# Patient Record
Sex: FEMALE | Race: WHITE | NOT HISPANIC OR LATINO | Employment: OTHER | URBAN - METROPOLITAN AREA
[De-identification: names, ages, dates, MRNs, and addresses within clinical notes are randomized per-mention and may not be internally consistent; named-entity substitution may affect disease eponyms.]

---

## 2018-09-21 ENCOUNTER — CONVERSION ENCOUNTER (OUTPATIENT)
Dept: FAMILY MEDICINE CLINIC | Facility: CLINIC | Age: 68
End: 2018-09-21

## 2018-09-26 ENCOUNTER — CONVERSION ENCOUNTER (OUTPATIENT)
Dept: FAMILY MEDICINE CLINIC | Facility: CLINIC | Age: 68
End: 2018-09-26

## 2018-10-26 ENCOUNTER — CONVERSION ENCOUNTER (OUTPATIENT)
Dept: FAMILY MEDICINE CLINIC | Facility: CLINIC | Age: 68
End: 2018-10-26

## 2018-11-02 ENCOUNTER — CONVERSION ENCOUNTER (OUTPATIENT)
Dept: FAMILY MEDICINE CLINIC | Facility: CLINIC | Age: 68
End: 2018-11-02

## 2018-12-07 ENCOUNTER — CONVERSION ENCOUNTER (OUTPATIENT)
Dept: FAMILY MEDICINE CLINIC | Facility: CLINIC | Age: 68
End: 2018-12-07

## 2019-05-15 ENCOUNTER — CONVERSION ENCOUNTER (OUTPATIENT)
Dept: FAMILY MEDICINE CLINIC | Facility: CLINIC | Age: 69
End: 2019-05-15

## 2019-05-15 LAB
ALBUMIN SERPL-MCNC: 3.6 G/DL (ref 3.6–5.1)
ALBUMIN/GLOB SERPL: ABNORMAL {RATIO} (ref 1–2.1)
ALP SERPL-CCNC: 28 UNITS/L (ref 33–130)
ALT SERPL-CCNC: 19 UNITS/L (ref 6–40)
AST SERPL-CCNC: 19 UNITS/L (ref 10–35)
BASOPHILS # BLD AUTO: ABNORMAL 10*3/MM3 (ref 0–200)
BASOPHILS NFR BLD AUTO: 1 %
BILIRUB SERPL-MCNC: 0.5 MG/DL (ref 0.2–1.2)
BUN SERPL-MCNC: 15 MG/DL (ref 7–25)
BUN/CREAT SERPL: ABNORMAL (ref 6–22)
CALCIUM SERPL-MCNC: 9.4 MG/DL (ref 8.6–10.2)
CHLORIDE SERPL-SCNC: 96 MMOL/L (ref 98–110)
CONV CO2: 23 MMOL/L (ref 21–33)
CONV NEUTROPHILS/100 LEUKOCYTES IN BODY FLUID BY MANUAL COUNT: 71 %
CONV TOTAL PROTEIN: 6.9 G/DL (ref 6.2–8.3)
CREAT UR-MCNC: 0.9 MG/DL (ref 0.6–1.18)
EOSINOPHIL # BLD AUTO: 2 %
EOSINOPHIL # BLD AUTO: ABNORMAL 10*3/MM3 (ref 15–500)
ERYTHROCYTE [DISTWIDTH] IN BLOOD BY AUTOMATED COUNT: 13.9 % (ref 11–15)
GLOBULIN UR ELPH-MCNC: ABNORMAL G/DL (ref 2.2–3.9)
GLUCOSE SERPL-MCNC: 136 MG/DL (ref 65–99)
HCT VFR BLD AUTO: 38.1 % (ref 35–45)
HGB BLD-MCNC: 12.6 G/DL (ref 11.7–15.5)
LYMPHOCYTES # BLD AUTO: ABNORMAL 10*3/MM3 (ref 850–3900)
LYMPHOCYTES NFR BLD AUTO: 18 %
MCH RBC QN AUTO: 31.8 PG (ref 27–33)
MCHC RBC AUTO-ENTMCNC: ABNORMAL % (ref 32–36)
MCV RBC AUTO: 96.2 FL (ref 80–100)
MONOCYTES # BLD AUTO: ABNORMAL 10*3/MICROLITER (ref 200–950)
MONOCYTES NFR BLD AUTO: 8 %
NEUTROPHILS # BLD AUTO: ABNORMAL 10*3/MM3 (ref 1500–7800)
PLATELET # BLD AUTO: ABNORMAL 10*3/MM3 (ref 140–400)
PMV BLD AUTO: 8 FL (ref 7.5–11.5)
POTASSIUM SERPL-SCNC: 4.3 MMOL/L (ref 3.5–5.3)
RBC # BLD AUTO: ABNORMAL 10*6/MM3 (ref 3.8–5.1)
SODIUM SERPL-SCNC: 133 MMOL/L (ref 135–146)
WBC # BLD AUTO: ABNORMAL 10*3/MM3 (ref 3.8–10.8)

## 2019-06-03 VITALS
SYSTOLIC BLOOD PRESSURE: 114 MMHG | DIASTOLIC BLOOD PRESSURE: 90 MMHG | SYSTOLIC BLOOD PRESSURE: 123 MMHG | DIASTOLIC BLOOD PRESSURE: 70 MMHG | SYSTOLIC BLOOD PRESSURE: 140 MMHG | DIASTOLIC BLOOD PRESSURE: 79 MMHG | SYSTOLIC BLOOD PRESSURE: 132 MMHG | DIASTOLIC BLOOD PRESSURE: 75 MMHG | HEART RATE: 91 BPM | HEART RATE: 85 BPM | DIASTOLIC BLOOD PRESSURE: 87 MMHG | HEART RATE: 91 BPM | HEART RATE: 86 BPM | SYSTOLIC BLOOD PRESSURE: 103 MMHG

## 2019-07-02 DIAGNOSIS — E11.9 TYPE 2 DIABETES MELLITUS WITHOUT COMPLICATION, UNSPECIFIED WHETHER LONG TERM INSULIN USE (HCC): ICD-10-CM

## 2019-07-02 DIAGNOSIS — E03.9 HYPOTHYROIDISM, UNSPECIFIED TYPE: ICD-10-CM

## 2019-07-02 DIAGNOSIS — I10 HYPERTENSION, UNSPECIFIED TYPE: ICD-10-CM

## 2019-07-02 DIAGNOSIS — IMO0002 ELEVATION OF LEVEL OF TRANSAMINASE OR LACTIC ACID DEHYDROGENASE (LDH): ICD-10-CM

## 2019-07-02 DIAGNOSIS — R74.8 ABNORMAL LIVER ENZYMES: ICD-10-CM

## 2019-07-02 DIAGNOSIS — N18.30 CHRONIC KIDNEY DISEASE, STAGE III (MODERATE) (HCC): ICD-10-CM

## 2019-07-02 DIAGNOSIS — E78.5 HYPERLIPIDEMIA, UNSPECIFIED HYPERLIPIDEMIA TYPE: Primary | ICD-10-CM

## 2019-07-02 PROBLEM — R15.9 BOWEL INCONTINENCE: Status: ACTIVE | Noted: 2018-04-23

## 2019-07-02 PROBLEM — Z91.14 POOR COMPLIANCE WITH MEDICATION: Status: ACTIVE | Noted: 2018-09-18

## 2019-07-02 PROBLEM — M19.90 OSTEOARTHRITIS: Status: ACTIVE | Noted: 2019-07-02

## 2019-07-02 PROBLEM — R00.0 TACHYCARDIA: Status: ACTIVE | Noted: 2018-09-18

## 2019-07-02 PROBLEM — R21 SKIN RASH: Status: ACTIVE | Noted: 2019-01-14

## 2019-07-02 PROBLEM — Z23 ENCOUNTER FOR IMMUNIZATION: Status: ACTIVE | Noted: 2017-09-20

## 2019-07-02 PROBLEM — F32.A DEPRESSION: Status: ACTIVE | Noted: 2019-07-02

## 2019-07-02 PROBLEM — Z91.148 POOR COMPLIANCE WITH MEDICATION: Status: ACTIVE | Noted: 2018-09-18

## 2019-07-02 PROBLEM — R41.0 CONFUSION: Status: ACTIVE | Noted: 2017-09-20

## 2019-07-03 ENCOUNTER — TELEPHONE (OUTPATIENT)
Dept: FAMILY MEDICINE CLINIC | Facility: CLINIC | Age: 69
End: 2019-07-03

## 2019-07-03 LAB
ALBUMIN SERPL-MCNC: 4.2 G/DL (ref 3.5–4.8)
ALBUMIN/GLOB SERPL: 1.3 {RATIO} (ref 1.2–2.2)
ALP SERPL-CCNC: 34 IU/L (ref 39–117)
ALT SERPL-CCNC: 15 IU/L (ref 0–32)
AST SERPL-CCNC: 14 IU/L (ref 0–40)
BASOPHILS # BLD AUTO: 0.1 X10E3/UL (ref 0–0.2)
BASOPHILS NFR BLD AUTO: 1 %
BILIRUB SERPL-MCNC: <0.2 MG/DL (ref 0–1.2)
BUN SERPL-MCNC: 19 MG/DL (ref 8–27)
BUN/CREAT SERPL: 25 (ref 12–28)
CALCIUM SERPL-MCNC: 9.9 MG/DL (ref 8.7–10.3)
CHLORIDE SERPL-SCNC: 94 MMOL/L (ref 96–106)
CHOLEST SERPL-MCNC: 157 MG/DL (ref 100–199)
CO2 SERPL-SCNC: 25 MMOL/L (ref 20–29)
CREAT SERPL-MCNC: 0.75 MG/DL (ref 0.57–1)
EOSINOPHIL # BLD AUTO: 0.2 X10E3/UL (ref 0–0.4)
EOSINOPHIL NFR BLD AUTO: 2 %
ERYTHROCYTE [DISTWIDTH] IN BLOOD BY AUTOMATED COUNT: 13.2 % (ref 12.3–15.4)
GLOBULIN SER CALC-MCNC: 3.3 G/DL (ref 1.5–4.5)
GLUCOSE SERPL-MCNC: 112 MG/DL (ref 65–99)
HBA1C MFR BLD: 7 % (ref 4.8–5.6)
HCT VFR BLD AUTO: 40.3 % (ref 34–46.6)
HDLC SERPL-MCNC: 51 MG/DL
HGB BLD-MCNC: 13.4 G/DL (ref 11.1–15.9)
IMM GRANULOCYTES # BLD AUTO: 0 X10E3/UL (ref 0–0.1)
IMM GRANULOCYTES NFR BLD AUTO: 0 %
LACTATE SERPL-MCNC: 17.2 MG/DL (ref 4.8–25.7)
LDLC SERPL CALC-MCNC: 82 MG/DL (ref 0–99)
LYMPHOCYTES # BLD AUTO: 1.6 X10E3/UL (ref 0.7–3.1)
LYMPHOCYTES NFR BLD AUTO: 18 %
MCH RBC QN AUTO: 31.2 PG (ref 26.6–33)
MCHC RBC AUTO-ENTMCNC: 33.3 G/DL (ref 31.5–35.7)
MCV RBC AUTO: 94 FL (ref 79–97)
MONOCYTES # BLD AUTO: 0.8 X10E3/UL (ref 0.1–0.9)
MONOCYTES NFR BLD AUTO: 10 %
NEUTROPHILS # BLD AUTO: 6.1 X10E3/UL (ref 1.4–7)
NEUTROPHILS NFR BLD AUTO: 69 %
PLATELET # BLD AUTO: 391 X10E3/UL (ref 150–450)
POTASSIUM SERPL-SCNC: 5.2 MMOL/L (ref 3.5–5.2)
PROT SERPL-MCNC: 7.5 G/DL (ref 6–8.5)
RBC # BLD AUTO: 4.29 X10E6/UL (ref 3.77–5.28)
SODIUM SERPL-SCNC: 132 MMOL/L (ref 134–144)
T3FREE SERPL-MCNC: 2.4 PG/ML (ref 2–4.4)
T4 SERPL-MCNC: 6.4 UG/DL (ref 4.5–12)
TRIGL SERPL-MCNC: 120 MG/DL (ref 0–149)
TSH SERPL DL<=0.005 MIU/L-ACNC: 2.46 UIU/ML (ref 0.45–4.5)
VLDLC SERPL CALC-MCNC: 24 MG/DL (ref 5–40)
WBC # BLD AUTO: 8.9 X10E3/UL (ref 3.4–10.8)

## 2019-07-03 RX ORDER — METFORMIN HYDROCHLORIDE 500 MG/1
TABLET, EXTENDED RELEASE ORAL
Qty: 60 TABLET | Refills: 5 | Status: SHIPPED | OUTPATIENT
Start: 2019-07-03 | End: 2019-07-05 | Stop reason: SDUPTHER

## 2019-07-03 RX ORDER — METFORMIN HYDROCHLORIDE 500 MG/1
TABLET, EXTENDED RELEASE ORAL SEE ADMIN INSTRUCTIONS
Refills: 0 | COMMUNITY
Start: 2019-06-05 | End: 2019-07-03 | Stop reason: SDUPTHER

## 2019-07-05 RX ORDER — METFORMIN HYDROCHLORIDE 500 MG/1
TABLET, EXTENDED RELEASE ORAL
Qty: 60 TABLET | Refills: 5 | Status: SHIPPED | OUTPATIENT
Start: 2019-07-05 | End: 2019-08-08 | Stop reason: ALTCHOICE

## 2019-07-15 RX ORDER — METOPROLOL SUCCINATE 100 MG/1
TABLET, EXTENDED RELEASE ORAL
Qty: 30 TABLET | Refills: 2 | Status: SHIPPED | OUTPATIENT
Start: 2019-07-15 | End: 2019-09-09 | Stop reason: SDUPTHER

## 2019-07-15 RX ORDER — LEVOTHYROXINE SODIUM 112 UG/1
TABLET ORAL
Qty: 30 TABLET | Refills: 2 | Status: SHIPPED | OUTPATIENT
Start: 2019-07-15 | End: 2019-09-09 | Stop reason: SDUPTHER

## 2019-07-15 RX ORDER — ENALAPRIL MALEATE 20 MG/1
TABLET ORAL
Qty: 60 TABLET | Refills: 2 | Status: SHIPPED | OUTPATIENT
Start: 2019-07-15 | End: 2019-09-09 | Stop reason: SDUPTHER

## 2019-07-29 RX ORDER — METOPROLOL SUCCINATE 50 MG/1
TABLET, EXTENDED RELEASE ORAL
Qty: 30 TABLET | Refills: 3 | Status: SHIPPED | OUTPATIENT
Start: 2019-07-29 | End: 2019-10-21 | Stop reason: SDUPTHER

## 2019-08-08 ENCOUNTER — OFFICE VISIT (OUTPATIENT)
Dept: FAMILY MEDICINE CLINIC | Facility: CLINIC | Age: 69
End: 2019-08-08

## 2019-08-08 VITALS
DIASTOLIC BLOOD PRESSURE: 80 MMHG | HEART RATE: 90 BPM | WEIGHT: 179 LBS | TEMPERATURE: 98 F | HEIGHT: 63 IN | BODY MASS INDEX: 31.71 KG/M2 | SYSTOLIC BLOOD PRESSURE: 137 MMHG

## 2019-08-08 DIAGNOSIS — I10 ESSENTIAL HYPERTENSION: Primary | ICD-10-CM

## 2019-08-08 DIAGNOSIS — F20.9 SCHIZOPHRENIA, UNSPECIFIED TYPE (HCC): ICD-10-CM

## 2019-08-08 DIAGNOSIS — Z79.4 TYPE 2 DIABETES MELLITUS WITH COMPLICATION, WITH LONG-TERM CURRENT USE OF INSULIN (HCC): ICD-10-CM

## 2019-08-08 DIAGNOSIS — N95.2 VAGINITIS, ATROPHIC: ICD-10-CM

## 2019-08-08 DIAGNOSIS — E11.8 TYPE 2 DIABETES MELLITUS WITH COMPLICATION, WITH LONG-TERM CURRENT USE OF INSULIN (HCC): ICD-10-CM

## 2019-08-08 DIAGNOSIS — N81.10 BLADDER PROLAPSE, FEMALE, ACQUIRED: Chronic | ICD-10-CM

## 2019-08-08 PROCEDURE — 99214 OFFICE O/P EST MOD 30 MIN: CPT | Performed by: NURSE PRACTITIONER

## 2019-08-08 RX ORDER — TRAZODONE HYDROCHLORIDE 150 MG/1
1 TABLET ORAL
Refills: 30 | COMMUNITY
Start: 2019-07-15 | End: 2020-04-20 | Stop reason: SDUPTHER

## 2019-08-08 RX ORDER — BIMATOPROST 0.01 %
1 DROPS OPHTHALMIC (EYE)
Refills: 11 | COMMUNITY
Start: 2019-07-05 | End: 2020-09-01 | Stop reason: SDUPTHER

## 2019-08-08 RX ORDER — OLANZAPINE 20 MG/1
1 TABLET ORAL
Refills: 2 | COMMUNITY
Start: 2019-07-09 | End: 2020-04-20 | Stop reason: SDUPTHER

## 2019-08-08 RX ORDER — VERAPAMIL HYDROCHLORIDE 240 MG/1
1 TABLET, FILM COATED, EXTENDED RELEASE ORAL DAILY
Refills: 5 | COMMUNITY
Start: 2019-07-29 | End: 2019-08-26 | Stop reason: SDUPTHER

## 2019-08-08 RX ORDER — PALIPERIDONE PALMITATE 234 MG/1.5ML
INJECTION INTRAMUSCULAR
Refills: 2 | COMMUNITY
Start: 2019-07-14

## 2019-08-08 RX ORDER — LORAZEPAM 0.5 MG/1
1 TABLET ORAL
Refills: 2 | COMMUNITY
Start: 2019-08-05 | End: 2020-04-20 | Stop reason: SDUPTHER

## 2019-08-08 RX ORDER — OXCARBAZEPINE 150 MG/1
1 TABLET, FILM COATED ORAL
COMMUNITY
Start: 2012-03-13 | End: 2020-04-20 | Stop reason: SDUPTHER

## 2019-08-08 RX ORDER — BUSPIRONE HYDROCHLORIDE 10 MG/1
1 TABLET ORAL 3 TIMES DAILY
Refills: 2 | COMMUNITY
Start: 2019-07-15 | End: 2020-04-20 | Stop reason: SDUPTHER

## 2019-08-08 RX ORDER — GLUCOSAM/CHON-MSM1/C/MANG/BOSW 500-416.6
TABLET ORAL
COMMUNITY
Start: 2013-06-17 | End: 2020-09-01 | Stop reason: SDUPTHER

## 2019-08-08 NOTE — PROGRESS NOTES
Subjective   Renetta Mondragon is a 69 y.o. female.     68-year-old obese white female with history of hernia, depression, hypertension, type 2 diabetes, hypothyroidism, SIADH/chronic renal failure, bladder prolapse who comes in today for 3-month follow-up visit.  Patient states the bladder prolapse does not bother that much blander set up with OB/GYN to make sure there is nothing going on that should be  Patient states her sugars are dropping very low in the morning I am taken her off of metformin extended release and placing her on the metformin 750 in the morning and 500 at night and advised her to eat a protein p.m. snack  Patient had a ankle injury to 3 months ago but it has resolved now with no swelling or pain  Blood pressure 136/80 she denies any chest pain, dyspnea, tachycardia, dizziness or edema  Weight is unchanged at 179  July blood work within normal limits    OB/GYN referral  DC metformin extended release  Metformin 750 mg every morning and 5 mg every afternoon and monitor blood sugars         The following portions of the patient's history were reviewed and updated as appropriate: allergies, current medications, past family history, past medical history, past social history, past surgical history and problem list.    Review of Systems   Constitutional: Negative.    Respiratory: Negative.    Cardiovascular: Negative.    Gastrointestinal: Negative.    Genitourinary:        Bladder prolapse   Musculoskeletal: Negative.    Skin: Negative.    Neurological: Negative.    Psychiatric/Behavioral: Negative.        Objective   Physical Exam   Constitutional: She is oriented to person, place, and time. She appears well-developed and well-nourished.   Cardiovascular: Normal rate and regular rhythm.   Pulmonary/Chest: Effort normal and breath sounds normal.   Abdominal: Soft. Bowel sounds are normal.   Genitourinary:   Genitourinary Comments: Bladder prolapse   Musculoskeletal: Normal range of motion.    Neurological: She is alert and oriented to person, place, and time.   Skin: Skin is warm and dry.         Assessment/Plan   Renetta was seen today for ankle pain and needs follow up labs done.    Diagnoses and all orders for this visit:    Essential hypertension    Type 2 diabetes mellitus with complication, with long-term current use of insulin (CMS/[facility])    Bladder prolapse, female, acquired    Vaginitis, atrophic    Schizophrenia, unspecified type (CMS/[facility])    Other orders  -     metFORMIN (GLUCOPHAGE) 500 MG tablet; Take 1 1/2 tab every morning and 1 tab at bedtime  -     glucose blood test strip; Check BS twice a day DX Code E11.9

## 2019-08-08 NOTE — PATIENT INSTRUCTIONS
Keep appointment OBGYN   metformin 750 mg q.a.m. And 500 mg QHS and monitor blood sugars   stop metformin extended release

## 2019-08-26 RX ORDER — VERAPAMIL HYDROCHLORIDE 240 MG/1
TABLET, FILM COATED, EXTENDED RELEASE ORAL
Qty: 30 TABLET | Refills: 2 | Status: SHIPPED | OUTPATIENT
Start: 2019-08-26 | End: 2019-11-19 | Stop reason: SDUPTHER

## 2019-09-15 RX ORDER — LEVOTHYROXINE SODIUM 112 UG/1
TABLET ORAL
Qty: 30 TABLET | Refills: 2 | Status: SHIPPED | OUTPATIENT
Start: 2019-09-15 | End: 2019-12-09 | Stop reason: SDUPTHER

## 2019-09-15 RX ORDER — ENALAPRIL MALEATE 20 MG/1
TABLET ORAL
Qty: 60 TABLET | Refills: 2 | Status: SHIPPED | OUTPATIENT
Start: 2019-09-15 | End: 2019-12-03 | Stop reason: SDUPTHER

## 2019-09-15 RX ORDER — METOPROLOL SUCCINATE 100 MG/1
TABLET, EXTENDED RELEASE ORAL
Qty: 30 TABLET | Refills: 2 | Status: SHIPPED | OUTPATIENT
Start: 2019-09-15 | End: 2019-12-09 | Stop reason: SDUPTHER

## 2019-09-23 ENCOUNTER — OFFICE VISIT (OUTPATIENT)
Dept: FAMILY MEDICINE CLINIC | Facility: CLINIC | Age: 69
End: 2019-09-23

## 2019-09-23 VITALS
SYSTOLIC BLOOD PRESSURE: 136 MMHG | HEART RATE: 91 BPM | DIASTOLIC BLOOD PRESSURE: 81 MMHG | OXYGEN SATURATION: 91 % | HEIGHT: 63 IN | TEMPERATURE: 98.6 F | WEIGHT: 183 LBS | BODY MASS INDEX: 32.43 KG/M2

## 2019-09-23 DIAGNOSIS — M25.572 CHRONIC PAIN OF LEFT ANKLE: Primary | ICD-10-CM

## 2019-09-23 DIAGNOSIS — Z79.4 TYPE 2 DIABETES MELLITUS WITH COMPLICATION, WITH LONG-TERM CURRENT USE OF INSULIN (HCC): ICD-10-CM

## 2019-09-23 DIAGNOSIS — E11.8 TYPE 2 DIABETES MELLITUS WITH COMPLICATION, WITH LONG-TERM CURRENT USE OF INSULIN (HCC): ICD-10-CM

## 2019-09-23 DIAGNOSIS — G89.29 CHRONIC PAIN OF LEFT ANKLE: Primary | ICD-10-CM

## 2019-09-23 PROCEDURE — 99213 OFFICE O/P EST LOW 20 MIN: CPT | Performed by: NURSE PRACTITIONER

## 2019-09-23 NOTE — PROGRESS NOTES
Subjective   Renetta Mondragon is a 69 y.o. female.     68 obese white female history of hernia, depression, hypertension, type 2 diabetes, hypothyroidism, SIADH chronic renal failure, bladder prolapse who comes in today for 3-month follow-up visit and she is still complaining of left ankle pain that started about 3 weeks ago and I will get a x-ray to make sure there is no stress fracture since it only hurts with weightbearing/  Blood sugars running less than 140 morning  Blood pressure 136/80 heart rate 90 she denies any chest pain, dyspnea, tachycardia, dizziness or edema     left ankle x-ray   3 month follow-up fasting         The following portions of the patient's history were reviewed and updated as appropriate: allergies, current medications, past family history, past medical history, past social history, past surgical history and problem list.    Review of Systems   Eyes: Negative.    Respiratory: Negative.    Cardiovascular: Negative.    Gastrointestinal: Negative.    Genitourinary: Negative.    Musculoskeletal:        Left ankle pain   Neurological: Negative.    Psychiatric/Behavioral: Negative.        Objective   Physical Exam   Constitutional: She is oriented to person, place, and time. She appears well-developed and well-nourished.   Cardiovascular: Regular rhythm.   Pulmonary/Chest: Effort normal.   Abdominal: Soft.   Musculoskeletal:   Continue left ankle pain with weight-bearing only   Neurological: She is alert and oriented to person, place, and time.   Skin: Skin is warm and dry.   Psychiatric: She has a normal mood and affect.         Assessment/Plan   Renetta was seen today for hypertension, hyperlipidemia and diabetes.    Diagnoses and all orders for this visit:    Chronic pain of left ankle  -     XR Ankle 3+ View Left; Future    Type 2 diabetes mellitus with complication, with long-term current use of insulin (CMS/[facility])    Other orders  -     diclofenac (VOLTAREN) 1 % gel gel; Apply 4 g topically  to the appropriate area as directed 4 (Four) Times a Day.

## 2019-09-27 ENCOUNTER — TELEPHONE (OUTPATIENT)
Dept: FAMILY MEDICINE CLINIC | Facility: CLINIC | Age: 69
End: 2019-09-27

## 2019-09-27 DIAGNOSIS — N81.10 FEMALE BLADDER PROLAPSE: Primary | ICD-10-CM

## 2019-09-27 NOTE — TELEPHONE ENCOUNTER
TC from patient's dtr (Jasmyn Quevedo) requesting referral to Elvis Franklin MD for bladder prolapse.

## 2019-10-02 ENCOUNTER — FLU SHOT (OUTPATIENT)
Dept: FAMILY MEDICINE CLINIC | Facility: CLINIC | Age: 69
End: 2019-10-02

## 2019-10-02 DIAGNOSIS — Z23 NEED FOR IMMUNIZATION AGAINST INFLUENZA: Primary | ICD-10-CM

## 2019-10-02 PROCEDURE — G0008 ADMIN INFLUENZA VIRUS VAC: HCPCS | Performed by: NURSE PRACTITIONER

## 2019-10-02 PROCEDURE — 90653 IIV ADJUVANT VACCINE IM: CPT | Performed by: NURSE PRACTITIONER

## 2019-10-09 ENCOUNTER — TELEPHONE (OUTPATIENT)
Dept: FAMILY MEDICINE CLINIC | Facility: CLINIC | Age: 69
End: 2019-10-09

## 2019-10-09 NOTE — TELEPHONE ENCOUNTER
TRIED CALLING PATIENT TO DISCUSS UROLOGY APPT W/DR. STRONG NO ANSWER NO VM - WHEN I SPOKE WITH DR. MOREL'S OFFICE THEY STATED THEY SEEN THIS PT IN 2017 AND SHE WAS TOLD THEN SHE NEEDS TO SEE A URO/GYNECOLOGIST DUE TO HER ISSUES AND THEY WANTED HER TO SEE ONE IN [place] - THEY WILL NOT RESCHEDULE THE PATIENT - WILL WAIT TO HEAR FROM PATIENT

## 2019-10-22 DIAGNOSIS — G89.29 CHRONIC PAIN OF LEFT ANKLE: ICD-10-CM

## 2019-10-22 DIAGNOSIS — M25.572 CHRONIC PAIN OF LEFT ANKLE: ICD-10-CM

## 2019-10-24 RX ORDER — METOPROLOL SUCCINATE 50 MG/1
TABLET, EXTENDED RELEASE ORAL
Qty: 30 TABLET | Refills: 3 | Status: SHIPPED | OUTPATIENT
Start: 2019-10-24 | End: 2020-02-29

## 2019-11-20 RX ORDER — VERAPAMIL HYDROCHLORIDE 240 MG/1
TABLET, FILM COATED, EXTENDED RELEASE ORAL
Qty: 30 TABLET | Refills: 2 | Status: SHIPPED | OUTPATIENT
Start: 2019-11-20 | End: 2019-12-17 | Stop reason: SDUPTHER

## 2019-12-03 RX ORDER — ENALAPRIL MALEATE 20 MG/1
TABLET ORAL
Qty: 60 TABLET | Refills: 2 | Status: SHIPPED | OUTPATIENT
Start: 2019-12-03 | End: 2020-02-29

## 2019-12-10 RX ORDER — LEVOTHYROXINE SODIUM 112 UG/1
TABLET ORAL
Qty: 30 TABLET | Refills: 2 | Status: SHIPPED | OUTPATIENT
Start: 2019-12-10 | End: 2020-03-09

## 2019-12-10 RX ORDER — METOPROLOL SUCCINATE 100 MG/1
TABLET, EXTENDED RELEASE ORAL
Qty: 30 TABLET | Refills: 2 | Status: SHIPPED | OUTPATIENT
Start: 2019-12-10 | End: 2020-03-09

## 2019-12-18 RX ORDER — VERAPAMIL HYDROCHLORIDE 240 MG/1
TABLET, FILM COATED, EXTENDED RELEASE ORAL
Qty: 30 TABLET | Refills: 2 | Status: SHIPPED | OUTPATIENT
Start: 2019-12-18 | End: 2020-03-23

## 2019-12-30 ENCOUNTER — OFFICE VISIT (OUTPATIENT)
Dept: FAMILY MEDICINE CLINIC | Facility: CLINIC | Age: 69
End: 2019-12-30

## 2019-12-30 VITALS
OXYGEN SATURATION: 93 % | TEMPERATURE: 98.6 F | WEIGHT: 180 LBS | SYSTOLIC BLOOD PRESSURE: 143 MMHG | HEIGHT: 63 IN | HEART RATE: 102 BPM | DIASTOLIC BLOOD PRESSURE: 84 MMHG | BODY MASS INDEX: 31.89 KG/M2

## 2019-12-30 DIAGNOSIS — Z00.00 PREVENTATIVE HEALTH CARE: ICD-10-CM

## 2019-12-30 DIAGNOSIS — E66.9 OBESITY WITH BODY MASS INDEX 30 OR GREATER: ICD-10-CM

## 2019-12-30 DIAGNOSIS — Z78.0 POSTMENOPAUSAL: ICD-10-CM

## 2019-12-30 DIAGNOSIS — E11.9 TYPE 2 DIABETES MELLITUS WITHOUT COMPLICATION, WITH LONG-TERM CURRENT USE OF INSULIN (HCC): ICD-10-CM

## 2019-12-30 DIAGNOSIS — I10 ESSENTIAL HYPERTENSION: ICD-10-CM

## 2019-12-30 DIAGNOSIS — Q43.8: ICD-10-CM

## 2019-12-30 DIAGNOSIS — Z79.4 TYPE 2 DIABETES MELLITUS WITHOUT COMPLICATION, WITH LONG-TERM CURRENT USE OF INSULIN (HCC): ICD-10-CM

## 2019-12-30 DIAGNOSIS — E78.5 HYPERLIPIDEMIA, UNSPECIFIED HYPERLIPIDEMIA TYPE: Primary | ICD-10-CM

## 2019-12-30 DIAGNOSIS — Z23 NEED FOR 23-POLYVALENT PNEUMOCOCCAL POLYSACCHARIDE VACCINE: ICD-10-CM

## 2019-12-30 PROCEDURE — G0009 ADMIN PNEUMOCOCCAL VACCINE: HCPCS | Performed by: NURSE PRACTITIONER

## 2019-12-30 PROCEDURE — 99214 OFFICE O/P EST MOD 30 MIN: CPT | Performed by: NURSE PRACTITIONER

## 2019-12-30 PROCEDURE — 90732 PPSV23 VACC 2 YRS+ SUBQ/IM: CPT | Performed by: NURSE PRACTITIONER

## 2019-12-30 NOTE — PROGRESS NOTES
Subjective   Renetta Mondragon is a 69 y.o. female.       69-year-old white female with history of hiatal hernia, depression, hypertension, type 2 diabetes, hypothyroidism, SIADH, chronic renal failure, bladder and rectal prolapse who comes in today for a three-month follow-up visit fasting blood work.  Patient has been to GI since I saw her last about her rectal prolapse and he stated the only thing she can possibly do a is a colostomy  And there are no other options to repair this.  Patient refuses to have colostomy.  She is currently having no problems having bowel movements because her stools are loose   patient is a was having a lot of left ankle pain after an injury 3 months ago but states it is much better now.  Patient does have a pain in her toes due to thickened curved nails and tenia nail fungus and I suggested a podiatrist   blood pressure 142/86 heart rate 102 she denies any chest pain, dyspnea, tachycardia or dizziness   patient still claims her fasting blood sugars are less than 140 in the morning   weight is 180 lb   patient no longer does colonoscopies or mammograms but she is up-to-date on her eye exams.  I am scheduling her DEXA scan and she is getting her Pneumovax 23 today     fasting blood work today   Pneumovax 23   DEXA scan       The following portions of the patient's history were reviewed and updated as appropriate: allergies, current medications, past family history, past medical history, past social history, past surgical history and problem list.    Review of Systems   Constitutional: Negative.    HENT: Negative.    Respiratory: Negative.    Cardiovascular: Negative.    Gastrointestinal:        Prolapsed rectum   Genitourinary: Negative.    Musculoskeletal: Negative.    Skin: Negative.    Neurological: Negative.    Psychiatric/Behavioral: Negative.        Objective   Physical Exam   Constitutional: She is oriented to person, place, and time. She appears well-developed and well-nourished.    Cardiovascular: Normal rate and regular rhythm.   Pulmonary/Chest: Effort normal and breath sounds normal.   Abdominal: Bowel sounds are normal.   Prolapsed rectum   Musculoskeletal: Normal range of motion.   Slightly unsteady gait   Neurological: She is alert and oriented to person, place, and time.   Skin: Skin is warm and dry.   Psychiatric: She has a normal mood and affect.         Assessment/Plan   Renetta was seen today for hypertension, diabetes and hypothyroidism.    Diagnoses and all orders for this visit:    Hyperlipidemia, unspecified hyperlipidemia type  -     Lipid Panel With LDL / HDL Ratio    Type 2 diabetes mellitus without complication, with long-term current use of insulin (CMS/[facility])  -     Comprehensive Metabolic Panel  -     Hemoglobin A1c    Preventative health care  -     CBC & Differential    Essential hypertension    Obesity with body mass index 30 or greater    Congenital prolapsed rectum

## 2019-12-31 LAB
ALBUMIN SERPL-MCNC: 4.3 G/DL (ref 3.5–4.8)
ALBUMIN/GLOB SERPL: 1.3 {RATIO} (ref 1.2–2.2)
ALP SERPL-CCNC: 37 IU/L (ref 39–117)
ALT SERPL-CCNC: 16 IU/L (ref 0–32)
AST SERPL-CCNC: 16 IU/L (ref 0–40)
BASOPHILS # BLD AUTO: 0.1 X10E3/UL (ref 0–0.2)
BASOPHILS NFR BLD AUTO: 1 %
BILIRUB SERPL-MCNC: <0.2 MG/DL (ref 0–1.2)
BUN SERPL-MCNC: 15 MG/DL (ref 8–27)
BUN/CREAT SERPL: 17 (ref 12–28)
CALCIUM SERPL-MCNC: 9.7 MG/DL (ref 8.7–10.3)
CHLORIDE SERPL-SCNC: 97 MMOL/L (ref 96–106)
CHOLEST SERPL-MCNC: 187 MG/DL (ref 100–199)
CO2 SERPL-SCNC: 22 MMOL/L (ref 20–29)
CREAT SERPL-MCNC: 0.86 MG/DL (ref 0.57–1)
EOSINOPHIL # BLD AUTO: 0.8 X10E3/UL (ref 0–0.4)
EOSINOPHIL NFR BLD AUTO: 7 %
ERYTHROCYTE [DISTWIDTH] IN BLOOD BY AUTOMATED COUNT: 13.9 % (ref 12.3–15.4)
GLOBULIN SER CALC-MCNC: 3.4 G/DL (ref 1.5–4.5)
GLUCOSE SERPL-MCNC: 132 MG/DL (ref 65–99)
HBA1C MFR BLD: 7 % (ref 4.8–5.6)
HCT VFR BLD AUTO: 40.2 % (ref 34–46.6)
HDLC SERPL-MCNC: 42 MG/DL
HGB BLD-MCNC: 13.4 G/DL (ref 11.1–15.9)
IMM GRANULOCYTES # BLD AUTO: 0.1 X10E3/UL (ref 0–0.1)
IMM GRANULOCYTES NFR BLD AUTO: 1 %
LDLC SERPL CALC-MCNC: 98 MG/DL (ref 0–99)
LDLC/HDLC SERPL: 2.3 RATIO (ref 0–3.2)
LYMPHOCYTES # BLD AUTO: 1.7 X10E3/UL (ref 0.7–3.1)
LYMPHOCYTES NFR BLD AUTO: 15 %
MCH RBC QN AUTO: 31.7 PG (ref 26.6–33)
MCHC RBC AUTO-ENTMCNC: 33.3 G/DL (ref 31.5–35.7)
MCV RBC AUTO: 95 FL (ref 79–97)
MONOCYTES # BLD AUTO: 0.9 X10E3/UL (ref 0.1–0.9)
MONOCYTES NFR BLD AUTO: 8 %
NEUTROPHILS # BLD AUTO: 7.8 X10E3/UL (ref 1.4–7)
NEUTROPHILS NFR BLD AUTO: 68 %
PLATELET # BLD AUTO: 358 X10E3/UL (ref 150–450)
POTASSIUM SERPL-SCNC: 5 MMOL/L (ref 3.5–5.2)
PROT SERPL-MCNC: 7.7 G/DL (ref 6–8.5)
RBC # BLD AUTO: 4.23 X10E6/UL (ref 3.77–5.28)
SODIUM SERPL-SCNC: 138 MMOL/L (ref 134–144)
TRIGL SERPL-MCNC: 233 MG/DL (ref 0–149)
VLDLC SERPL CALC-MCNC: 47 MG/DL (ref 5–40)
WBC # BLD AUTO: 11.3 X10E3/UL (ref 3.4–10.8)

## 2019-12-31 RX ORDER — FENOFIBRATE 145 MG/1
145 TABLET, COATED ORAL
Qty: 30 TABLET | Refills: 2 | Status: SHIPPED | OUTPATIENT
Start: 2019-12-31 | End: 2020-02-10

## 2020-01-09 DIAGNOSIS — Z78.0 POSTMENOPAUSAL: ICD-10-CM

## 2020-02-10 RX ORDER — FENOFIBRATE 145 MG/1
TABLET, COATED ORAL
Qty: 30 TABLET | Refills: 2 | Status: SHIPPED | OUTPATIENT
Start: 2020-02-10 | End: 2020-04-20 | Stop reason: SDUPTHER

## 2020-02-29 RX ORDER — ENALAPRIL MALEATE 20 MG/1
TABLET ORAL
Qty: 60 TABLET | Refills: 2 | Status: SHIPPED | OUTPATIENT
Start: 2020-02-29 | End: 2020-04-20 | Stop reason: SDUPTHER

## 2020-02-29 RX ORDER — METOPROLOL SUCCINATE 50 MG/1
TABLET, EXTENDED RELEASE ORAL
Qty: 30 TABLET | Refills: 3 | Status: SHIPPED | OUTPATIENT
Start: 2020-02-29 | End: 2020-04-20 | Stop reason: SDUPTHER

## 2020-03-09 RX ORDER — LEVOTHYROXINE SODIUM 112 UG/1
TABLET ORAL
Qty: 30 TABLET | Refills: 2 | Status: SHIPPED | OUTPATIENT
Start: 2020-03-09 | End: 2020-04-20 | Stop reason: SDUPTHER

## 2020-03-09 RX ORDER — METOPROLOL SUCCINATE 100 MG/1
TABLET, EXTENDED RELEASE ORAL
Qty: 30 TABLET | Refills: 2 | Status: SHIPPED | OUTPATIENT
Start: 2020-03-09 | End: 2020-04-20 | Stop reason: SDUPTHER

## 2020-03-23 RX ORDER — VERAPAMIL HYDROCHLORIDE 240 MG/1
TABLET, FILM COATED, EXTENDED RELEASE ORAL
Qty: 30 TABLET | Refills: 2 | Status: SHIPPED | OUTPATIENT
Start: 2020-03-23 | End: 2020-04-20 | Stop reason: SDUPTHER

## 2020-04-20 RX ORDER — METOPROLOL SUCCINATE 50 MG/1
50 TABLET, EXTENDED RELEASE ORAL DAILY
Qty: 30 TABLET | Refills: 3 | Status: SHIPPED | OUTPATIENT
Start: 2020-04-20 | End: 2020-11-10

## 2020-04-20 RX ORDER — OXCARBAZEPINE 150 MG/1
150 TABLET, FILM COATED ORAL
Qty: 30 TABLET | Refills: 2 | Status: SHIPPED | OUTPATIENT
Start: 2020-04-20

## 2020-04-20 RX ORDER — ENALAPRIL MALEATE 20 MG/1
20 TABLET ORAL 2 TIMES DAILY
Qty: 60 TABLET | Refills: 2 | Status: SHIPPED | OUTPATIENT
Start: 2020-04-20 | End: 2020-04-24 | Stop reason: SDUPTHER

## 2020-04-20 RX ORDER — LORAZEPAM 0.5 MG/1
0.5 TABLET ORAL
Qty: 30 TABLET | Refills: 2 | Status: SHIPPED | OUTPATIENT
Start: 2020-04-20 | End: 2020-08-01 | Stop reason: SDUPTHER

## 2020-04-20 RX ORDER — OLANZAPINE 20 MG/1
20 TABLET ORAL
Qty: 30 TABLET | Refills: 2 | Status: SHIPPED | OUTPATIENT
Start: 2020-04-20

## 2020-04-20 RX ORDER — LEVOTHYROXINE SODIUM 112 UG/1
112 TABLET ORAL DAILY
Qty: 30 TABLET | Refills: 2 | Status: SHIPPED | OUTPATIENT
Start: 2020-04-20 | End: 2020-10-20

## 2020-04-20 RX ORDER — BUSPIRONE HYDROCHLORIDE 10 MG/1
10 TABLET ORAL 3 TIMES DAILY
Qty: 90 TABLET | Refills: 2 | Status: SHIPPED | OUTPATIENT
Start: 2020-04-20 | End: 2021-04-27 | Stop reason: SDUPTHER

## 2020-04-20 RX ORDER — FENOFIBRATE 145 MG/1
145 TABLET, COATED ORAL
Qty: 30 TABLET | Refills: 2 | Status: SHIPPED | OUTPATIENT
Start: 2020-04-20 | End: 2020-04-24 | Stop reason: SDUPTHER

## 2020-04-20 RX ORDER — VERAPAMIL HYDROCHLORIDE 240 MG/1
240 TABLET, FILM COATED, EXTENDED RELEASE ORAL DAILY
Qty: 30 TABLET | Refills: 2 | Status: SHIPPED | OUTPATIENT
Start: 2020-04-20 | End: 2020-12-03

## 2020-04-20 RX ORDER — TRAZODONE HYDROCHLORIDE 150 MG/1
150 TABLET ORAL
Qty: 30 TABLET | Refills: 2 | Status: SHIPPED | OUTPATIENT
Start: 2020-04-20 | End: 2021-03-30

## 2020-04-20 RX ORDER — METOPROLOL SUCCINATE 100 MG/1
TABLET, EXTENDED RELEASE ORAL
Qty: 30 TABLET | Refills: 2 | Status: SHIPPED | OUTPATIENT
Start: 2020-04-20 | End: 2020-10-20

## 2020-04-24 RX ORDER — FENOFIBRATE 145 MG/1
145 TABLET, COATED ORAL
Qty: 90 TABLET | Refills: 1 | Status: SHIPPED | OUTPATIENT
Start: 2020-04-24 | End: 2021-03-01

## 2020-04-24 RX ORDER — ENALAPRIL MALEATE 20 MG/1
20 TABLET ORAL 2 TIMES DAILY
Qty: 180 TABLET | Refills: 1 | Status: SHIPPED | OUTPATIENT
Start: 2020-04-24 | End: 2020-06-29 | Stop reason: SDUPTHER

## 2020-05-28 DIAGNOSIS — Z79.4 TYPE 2 DIABETES MELLITUS WITHOUT COMPLICATION, WITH LONG-TERM CURRENT USE OF INSULIN (HCC): Primary | ICD-10-CM

## 2020-05-28 DIAGNOSIS — I10 ESSENTIAL HYPERTENSION: ICD-10-CM

## 2020-05-28 DIAGNOSIS — E78.5 HYPERLIPIDEMIA, UNSPECIFIED HYPERLIPIDEMIA TYPE: ICD-10-CM

## 2020-05-28 DIAGNOSIS — E11.9 TYPE 2 DIABETES MELLITUS WITHOUT COMPLICATION, WITH LONG-TERM CURRENT USE OF INSULIN (HCC): Primary | ICD-10-CM

## 2020-05-29 ENCOUNTER — RESULTS ENCOUNTER (OUTPATIENT)
Dept: FAMILY MEDICINE CLINIC | Facility: CLINIC | Age: 70
End: 2020-05-29

## 2020-05-29 DIAGNOSIS — E11.9 TYPE 2 DIABETES MELLITUS WITHOUT COMPLICATION, WITH LONG-TERM CURRENT USE OF INSULIN (HCC): ICD-10-CM

## 2020-05-29 DIAGNOSIS — I10 ESSENTIAL HYPERTENSION: ICD-10-CM

## 2020-05-29 DIAGNOSIS — E78.5 HYPERLIPIDEMIA, UNSPECIFIED HYPERLIPIDEMIA TYPE: ICD-10-CM

## 2020-05-29 DIAGNOSIS — Z79.4 TYPE 2 DIABETES MELLITUS WITHOUT COMPLICATION, WITH LONG-TERM CURRENT USE OF INSULIN (HCC): ICD-10-CM

## 2020-06-03 ENCOUNTER — OFFICE VISIT (OUTPATIENT)
Dept: FAMILY MEDICINE CLINIC | Facility: CLINIC | Age: 70
End: 2020-06-03

## 2020-06-03 VITALS
HEART RATE: 97 BPM | WEIGHT: 181 LBS | OXYGEN SATURATION: 94 % | BODY MASS INDEX: 32.07 KG/M2 | HEIGHT: 63 IN | DIASTOLIC BLOOD PRESSURE: 79 MMHG | SYSTOLIC BLOOD PRESSURE: 116 MMHG | TEMPERATURE: 97.6 F

## 2020-06-03 DIAGNOSIS — E03.9 HYPOTHYROIDISM, UNSPECIFIED TYPE: ICD-10-CM

## 2020-06-03 DIAGNOSIS — I10 ESSENTIAL HYPERTENSION: ICD-10-CM

## 2020-06-03 DIAGNOSIS — E11.9 TYPE 2 DIABETES MELLITUS WITHOUT COMPLICATION, WITH LONG-TERM CURRENT USE OF INSULIN (HCC): ICD-10-CM

## 2020-06-03 DIAGNOSIS — R01.1 CARDIAC MURMUR: ICD-10-CM

## 2020-06-03 DIAGNOSIS — E78.5 HYPERLIPIDEMIA, UNSPECIFIED HYPERLIPIDEMIA TYPE: Primary | ICD-10-CM

## 2020-06-03 DIAGNOSIS — Z00.00 PREVENTATIVE HEALTH CARE: ICD-10-CM

## 2020-06-03 DIAGNOSIS — R06.00 DYSPNEA, UNSPECIFIED TYPE: ICD-10-CM

## 2020-06-03 DIAGNOSIS — J44.9 CHRONIC OBSTRUCTIVE PULMONARY DISEASE, UNSPECIFIED COPD TYPE (HCC): ICD-10-CM

## 2020-06-03 DIAGNOSIS — R15.9 INCONTINENCE OF FECES, UNSPECIFIED FECAL INCONTINENCE TYPE: ICD-10-CM

## 2020-06-03 DIAGNOSIS — Z79.4 TYPE 2 DIABETES MELLITUS WITHOUT COMPLICATION, WITH LONG-TERM CURRENT USE OF INSULIN (HCC): ICD-10-CM

## 2020-06-03 DIAGNOSIS — R06.02 SHORTNESS OF BREATH: ICD-10-CM

## 2020-06-03 DIAGNOSIS — Z12.31 OTHER SCREENING MAMMOGRAM: ICD-10-CM

## 2020-06-03 PROCEDURE — 99214 OFFICE O/P EST MOD 30 MIN: CPT | Performed by: NURSE PRACTITIONER

## 2020-06-03 RX ORDER — PRAVASTATIN SODIUM 10 MG
10 TABLET ORAL DAILY
Qty: 30 TABLET | Refills: 2 | Status: SHIPPED | OUTPATIENT
Start: 2020-06-03 | End: 2020-09-30 | Stop reason: SDUPTHER

## 2020-06-03 RX ORDER — ALBUTEROL SULFATE 90 UG/1
2 AEROSOL, METERED RESPIRATORY (INHALATION) EVERY 4 HOURS PRN
Qty: 1 INHALER | Refills: 6 | Status: SHIPPED | OUTPATIENT
Start: 2020-06-03 | End: 2020-07-20

## 2020-06-03 NOTE — PROGRESS NOTES
Renetta Mondragon is a 69 y.o. female.     69-year-old white female with history of hiatal hernia, depression, hypertension, type 2 diabetes, COPD hypothyroidism,SIADH, chronic renal failure, bladder and rectal prolapse who comes in with caregiver today for follow-up visit  Patient complaining about prolapsed rectum and incontinence getting worse.  She has been to gastroenterology and they state her only option is a colostomy patient is going to talk to her daughter and decide if they want to go that route  Patient states blood sugars average in the teens to 120s  New complaint is increased dyspnea with exertion and sometimes when speaking.  Lungs are diminished with fine rales.  I am doing a chest x-ray today placing her on albuterol inhaler and going to see if her daughter can buy her nebulizer.  She denies any cough or fever  Patient has has pain from gryphosus with tinea nail fungus bilaterally and has started seeing a podiatrist last month and would benefit from diabetic shoes.  Weight is about the same at 181  Blood pressure 116/78 heart rate 96 she denies any chest pain tachycardia dizziness or edema she does have a rather large murmur I am doing a 2D echo due to the new increased dyspnea  I am placing patient on a statin prophylactically even though she normally has a normal lipid panel  Patient up-to-date on eye exams I am scheduling her a mammogram    Chest x-ray  Albuterol inhaler as needed  Purchase nebulizer  Mammogram/2D echo   pravastatin 10 mg nightly  Discussed possible colostomy with daughter  Fasting blood work         The following portions of the patient's history were reviewed and updated as appropriate: allergies, current medications, past family history, past medical history, past social history, past surgical history and problem list.    Vitals:    06/03/20 1021   BP: 116/79   BP Location: Right arm   Patient Position: Sitting   Cuff Size: Large Adult   Pulse: 97   Temp: 97.6 °F (36.4 °C)  "  TempSrc: Temporal   SpO2: 94%   Weight: 82.1 kg (181 lb)   Height: 158.8 cm (62.5\")     Body mass index is 32.58 kg/m².    Past Medical History:   Diagnosis Date   • Diabetes mellitus (CMS/HCC)    • Hyperlipidemia    • Hypertension    • Hypothyroidism      Past Surgical History:   Procedure Laterality Date   • HYSTERECTOMY       Family History   Family history unknown: Yes     Immunization History   Administered Date(s) Administered   • FLUAD TRI 65YR+ 10/02/2019   • Flu Vaccine Intradermal Quad 18-64YR 10/18/2006, 09/15/2015   • Fluad Quad 10/02/2019   • Fluzone High Dose =>65 Years ([employer] ONLY) 09/12/2016, 09/20/2018   • Pneumococcal Conjugate 13-Valent (PCV13) 09/20/2017   • Pneumococcal Polysaccharide (PPSV23) 12/30/2019   • Zostavax 07/02/2012       Office Visit on 12/30/2019   Component Date Value Ref Range Status   • WBC 12/30/2019 11.3* 3.4 - 10.8 x10E3/uL Final   • RBC 12/30/2019 4.23  3.77 - 5.28 x10E6/uL Final   • Hemoglobin 12/30/2019 13.4  11.1 - 15.9 g/dL Final   • Hematocrit 12/30/2019 40.2  34.0 - 46.6 % Final   • MCV 12/30/2019 95  79 - 97 fL Final   • MCH 12/30/2019 31.7  26.6 - 33.0 pg Final   • MCHC 12/30/2019 33.3  31.5 - 35.7 g/dL Final   • RDW 12/30/2019 13.9  12.3 - 15.4 % Final    Comment: **Effective January 6, 2020, the RDW pediatric reference**    interval will be removed and the adult reference interval    will be changing to:                             Female 11.7 - 15.4                                                       Male 11.6 - 15.4     • Platelets 12/30/2019 358  150 - 450 x10E3/uL Final   • Neutrophil Rel % 12/30/2019 68  Not Estab. % Final   • Lymphocyte Rel % 12/30/2019 15  Not Estab. % Final   • Monocyte Rel % 12/30/2019 8  Not Estab. % Final   • Eosinophil Rel % 12/30/2019 7  Not Estab. % Final   • Basophil Rel % 12/30/2019 1  Not Estab. % Final   • Neutrophils Absolute 12/30/2019 7.8* 1.4 - 7.0 x10E3/uL Final   • Lymphocytes Absolute 12/30/2019 1.7  0.7 - 3.1 " x10E3/uL Final   • Monocytes Absolute 12/30/2019 0.9  0.1 - 0.9 x10E3/uL Final   • Eosinophils Absolute 12/30/2019 0.8* 0.0 - 0.4 x10E3/uL Final   • Basophils Absolute 12/30/2019 0.1  0.0 - 0.2 x10E3/uL Final   • Immature Granulocyte Rel % 12/30/2019 1  Not Estab. % Final   • Immature Grans Absolute 12/30/2019 0.1  0.0 - 0.1 x10E3/uL Final   • Glucose 12/30/2019 132* 65 - 99 mg/dL Final   • BUN 12/30/2019 15  8 - 27 mg/dL Final   • Creatinine 12/30/2019 0.86  0.57 - 1.00 mg/dL Final   • eGFR Non African Am 12/30/2019 69  >59 mL/min/1.73 Final   • eGFR African Am 12/30/2019 79  >59 mL/min/1.73 Final   • BUN/Creatinine Ratio 12/30/2019 17  12 - 28 Final   • Sodium 12/30/2019 138  134 - 144 mmol/L Final   • Potassium 12/30/2019 5.0  3.5 - 5.2 mmol/L Final   • Chloride 12/30/2019 97  96 - 106 mmol/L Final   • Total CO2 12/30/2019 22  20 - 29 mmol/L Final   • Calcium 12/30/2019 9.7  8.7 - 10.3 mg/dL Final   • Total Protein 12/30/2019 7.7  6.0 - 8.5 g/dL Final   • Albumin 12/30/2019 4.3  3.5 - 4.8 g/dL Final   • Globulin 12/30/2019 3.4  1.5 - 4.5 g/dL Final   • A/G Ratio 12/30/2019 1.3  1.2 - 2.2 Final   • Total Bilirubin 12/30/2019 <0.2  0.0 - 1.2 mg/dL Final   • Alkaline Phosphatase 12/30/2019 37* 39 - 117 IU/L Final   • AST (SGOT) 12/30/2019 16  0 - 40 IU/L Final   • ALT (SGPT) 12/30/2019 16  0 - 32 IU/L Final   • Hemoglobin A1C 12/30/2019 7.0* 4.8 - 5.6 % Final    Comment:          Prediabetes: 5.7 - 6.4           Diabetes: >6.4           Glycemic control for adults with diabetes: <7.0     • Total Cholesterol 12/30/2019 187  100 - 199 mg/dL Final   • Triglycerides 12/30/2019 233* 0 - 149 mg/dL Final   • HDL Cholesterol 12/30/2019 42  >39 mg/dL Final   • VLDL Cholesterol 12/30/2019 47* 5 - 40 mg/dL Final   • LDL Cholesterol  12/30/2019 98  0 - 99 mg/dL Final   • LDL/HDL Ratio 12/30/2019 2.3  0.0 - 3.2 ratio Final    Comment:                                     LDL/HDL Ratio                                               Men  Women                                1/2 Avg.Risk  1.0    1.5                                    Avg.Risk  3.6    3.2                                 2X Avg.Risk  6.2    5.0                                 3X Avg.Risk  8.0    6.1           Review of Systems   Constitutional: Negative.    HENT: Negative.    Respiratory: Positive for shortness of breath.    Cardiovascular: Negative.    Gastrointestinal: Positive for diarrhea.   Genitourinary: Negative.    Musculoskeletal: Negative.    Skin: Negative.    Neurological: Negative.    Psychiatric/Behavioral: Negative.        Objective   Physical Exam   Constitutional: She is oriented to person, place, and time. She appears well-developed and well-nourished.   Cardiovascular: Normal rate and regular rhythm.   Murmur heard.  Pulmonary/Chest: Effort normal.   Lungs diminished with fine rales   Abdominal: Soft. Bowel sounds are normal.   Large prolapsed rectum with stool incontinence   Musculoskeletal: Normal range of motion.   Neurological: She is alert and oriented to person, place, and time.   Skin: Skin is warm and dry.   Thick curved nails with fungus   Psychiatric: She has a normal mood and affect.       Procedures    Assessment/Plan   Renetta was seen today for diabetes, hypertension, hyperlipidemia and hypothyroidism.    Diagnoses and all orders for this visit:    Hyperlipidemia, unspecified hyperlipidemia type  -     Lipid Panel With LDL / HDL Ratio    Hypothyroidism, unspecified type  -     T3  -     TSH+Free T4    Preventative health care  -     CBC & Differential    Type 2 diabetes mellitus without complication, with long-term current use of insulin (CMS/[facility])  -     Comprehensive Metabolic Panel  -     Hemoglobin A1c    Shortness of breath  -     XR Chest 2 View    Essential hypertension    Incontinence of feces, unspecified fecal incontinence type    BMI 32.0-32.9,adult    Chronic obstructive pulmonary disease, unspecified COPD type (CMS/[facility])    Other orders  -      pravastatin (PRAVACHOL) 10 MG tablet; Take 1 tablet by mouth Daily.  -     albuterol sulfate HFA (Ventolin HFA) 108 (90 Base) MCG/ACT inhaler; Inhale 2 puffs Every 4 (Four) Hours As Needed for Wheezing.          Current Outpatient Medications:   •  Blood Glucose Monitoring Suppl (TRUE TRACK BLOOD GLUCOSE) device, Check BS twice a day DX Code E11.9, Disp: , Rfl:   •  busPIRone (BUSPAR) 10 MG tablet, Take 1 tablet by mouth 3 (Three) Times a Day., Disp: 90 tablet, Rfl: 2  •  calcium carb-cholecalciferol (CALCIUM 600/VITAMIN D3) 600-800 MG-UNIT tablet, Take 1 tablet by mouth Daily., Disp: 90 tablet, Rfl: 3  •  diclofenac (VOLTAREN) 1 % gel gel, Apply 4 g topically to the appropriate area as directed 4 (Four) Times a Day., Disp: 1 tube, Rfl: 2  •  enalapril (VASOTEC) 20 MG tablet, Take 1 tablet by mouth 2 (Two) Times a Day., Disp: 180 tablet, Rfl: 1  •  fenofibrate (TRICOR) 145 MG tablet, Take 1 tablet by mouth every night at bedtime., Disp: 90 tablet, Rfl: 1  •  glucose blood test strip, Check blood sugar twice daily, Disp: 100 each, Rfl: 6  •  INVEGA SUSTENNA 234 MG/1.5ML suspension prefilled syringe IM injection, inject 1.5ml IM every month, Disp: , Rfl: 2  •  levothyroxine (SYNTHROID, LEVOTHROID) 112 MCG tablet, Take 1 tablet by mouth Daily., Disp: 30 tablet, Rfl: 2  •  LORazepam (ATIVAN) 0.5 MG tablet, Take 1 tablet by mouth every night at bedtime., Disp: 30 tablet, Rfl: 2  •  LUMIGAN 0.01 % ophthalmic drops, Administer 1 drop to both eyes every night at bedtime., Disp: , Rfl: 11  •  metFORMIN (GLUCOPHAGE) 500 MG tablet, Take 1.5 tablets by mouth in the morning and 1 tablet in the evening, Disp: 225 tablet, Rfl: 1  •  metoprolol succinate XL (TOPROL-XL) 100 MG 24 hr tablet, Take 1 tablet by mouth in the morning, Disp: 30 tablet, Rfl: 2  •  metoprolol succinate XL (TOPROL-XL) 50 MG 24 hr tablet, Take 1 tablet by mouth Daily., Disp: 30 tablet, Rfl: 3  •  OLANZapine (zyPREXA) 20 MG tablet, Take 1 tablet by mouth  every night at bedtime., Disp: 30 tablet, Rfl: 2  •  OXcarbazepine (Trileptal) 150 MG tablet, Take 1 tablet by mouth every night at bedtime., Disp: 30 tablet, Rfl: 2  •  traZODone (DESYREL) 150 MG tablet, Take 1 tablet by mouth every night at bedtime., Disp: 30 tablet, Rfl: 2  •  TRUEPLUS LANCETS 30G misc, Check BS twice a day DX Code E11.9, Disp: , Rfl:   •  verapamil SR (CALAN-SR) 240 MG CR tablet, Take 1 tablet by mouth Daily., Disp: 30 tablet, Rfl: 2  •  albuterol sulfate HFA (Ventolin HFA) 108 (90 Base) MCG/ACT inhaler, Inhale 2 puffs Every 4 (Four) Hours As Needed for Wheezing., Disp: 1 inhaler, Rfl: 6  •  pravastatin (PRAVACHOL) 10 MG tablet, Take 1 tablet by mouth Daily., Disp: 30 tablet, Rfl: 2

## 2020-06-03 NOTE — PATIENT INSTRUCTIONS
Chest x-ray today  Fasting blood work today  Albuterol inhaler as needed  Find out about purchasing a nebulizer  Keep appointment for mammogram and 2D echo  Take pravastatin as directed  Discussed with daughter about a possible colostomy follow-up 3 months

## 2020-06-04 ENCOUNTER — TELEPHONE (OUTPATIENT)
Dept: FAMILY MEDICINE CLINIC | Facility: CLINIC | Age: 70
End: 2020-06-04

## 2020-06-04 LAB
ALBUMIN SERPL-MCNC: 4.2 G/DL (ref 3.8–4.8)
ALBUMIN/GLOB SERPL: 1.4 {RATIO} (ref 1.2–2.2)
ALP SERPL-CCNC: 22 IU/L (ref 39–117)
ALT SERPL-CCNC: 13 IU/L (ref 0–32)
AST SERPL-CCNC: 15 IU/L (ref 0–40)
BASOPHILS # BLD AUTO: 0.1 X10E3/UL (ref 0–0.2)
BASOPHILS NFR BLD AUTO: 1 %
BILIRUB SERPL-MCNC: <0.2 MG/DL (ref 0–1.2)
BUN SERPL-MCNC: 31 MG/DL (ref 8–27)
BUN/CREAT SERPL: 17 (ref 12–28)
CALCIUM SERPL-MCNC: 10.1 MG/DL (ref 8.7–10.3)
CHLORIDE SERPL-SCNC: 95 MMOL/L (ref 96–106)
CHOLEST SERPL-MCNC: 146 MG/DL (ref 100–199)
CO2 SERPL-SCNC: 23 MMOL/L (ref 20–29)
CREAT SERPL-MCNC: 1.84 MG/DL (ref 0.57–1)
EOSINOPHIL # BLD AUTO: 0.2 X10E3/UL (ref 0–0.4)
EOSINOPHIL NFR BLD AUTO: 2 %
ERYTHROCYTE [DISTWIDTH] IN BLOOD BY AUTOMATED COUNT: 12.3 % (ref 11.7–15.4)
GLOBULIN SER CALC-MCNC: 3.1 G/DL (ref 1.5–4.5)
GLUCOSE SERPL-MCNC: 123 MG/DL (ref 65–99)
HBA1C MFR BLD: 7.5 % (ref 4.8–5.6)
HCT VFR BLD AUTO: 38.3 % (ref 34–46.6)
HDLC SERPL-MCNC: 52 MG/DL
HGB BLD-MCNC: 12.5 G/DL (ref 11.1–15.9)
IMM GRANULOCYTES # BLD AUTO: 0.1 X10E3/UL (ref 0–0.1)
IMM GRANULOCYTES NFR BLD AUTO: 1 %
LDLC SERPL CALC-MCNC: 71 MG/DL (ref 0–99)
LDLC/HDLC SERPL: 1.4 RATIO (ref 0–3.2)
LYMPHOCYTES # BLD AUTO: 1.5 X10E3/UL (ref 0.7–3.1)
LYMPHOCYTES NFR BLD AUTO: 15 %
MCH RBC QN AUTO: 31.5 PG (ref 26.6–33)
MCHC RBC AUTO-ENTMCNC: 32.6 G/DL (ref 31.5–35.7)
MCV RBC AUTO: 97 FL (ref 79–97)
MONOCYTES # BLD AUTO: 1 X10E3/UL (ref 0.1–0.9)
MONOCYTES NFR BLD AUTO: 10 %
NEUTROPHILS # BLD AUTO: 7.1 X10E3/UL (ref 1.4–7)
NEUTROPHILS NFR BLD AUTO: 71 %
PLATELET # BLD AUTO: 393 X10E3/UL (ref 150–450)
POTASSIUM SERPL-SCNC: 5 MMOL/L (ref 3.5–5.2)
PROT SERPL-MCNC: 7.3 G/DL (ref 6–8.5)
RBC # BLD AUTO: 3.97 X10E6/UL (ref 3.77–5.28)
SODIUM SERPL-SCNC: 133 MMOL/L (ref 134–144)
T3 SERPL-MCNC: 81 NG/DL (ref 71–180)
T4 FREE SERPL-MCNC: 1.31 NG/DL (ref 0.82–1.77)
TRIGL SERPL-MCNC: 113 MG/DL (ref 0–149)
TSH SERPL DL<=0.005 MIU/L-ACNC: 3.45 UIU/ML (ref 0.45–4.5)
VLDLC SERPL CALC-MCNC: 23 MG/DL (ref 5–40)
WBC # BLD AUTO: 9.9 X10E3/UL (ref 3.4–10.8)

## 2020-06-04 RX ORDER — IPRATROPIUM BROMIDE AND ALBUTEROL SULFATE 2.5; .5 MG/3ML; MG/3ML
3 SOLUTION RESPIRATORY (INHALATION) EVERY 6 HOURS PRN
Qty: 360 ML | Refills: 1 | Status: SHIPPED | OUTPATIENT
Start: 2020-06-04 | End: 2020-10-22

## 2020-06-04 NOTE — TELEPHONE ENCOUNTER
Kathia is going to get the Nebulizer from [facility].  Ryanne said to send in Du-Neb for her as well.  SG

## 2020-06-10 DIAGNOSIS — R01.1 CARDIAC MURMUR: ICD-10-CM

## 2020-06-10 DIAGNOSIS — R06.00 DYSPNEA, UNSPECIFIED TYPE: ICD-10-CM

## 2020-06-10 DIAGNOSIS — R06.02 SHORTNESS OF BREATH: Primary | ICD-10-CM

## 2020-06-10 DIAGNOSIS — N18.30 CHRONIC KIDNEY DISEASE, STAGE III (MODERATE) (HCC): ICD-10-CM

## 2020-06-16 ENCOUNTER — RESULTS ENCOUNTER (OUTPATIENT)
Dept: FAMILY MEDICINE CLINIC | Facility: CLINIC | Age: 70
End: 2020-06-16

## 2020-06-16 DIAGNOSIS — R01.1 CARDIAC MURMUR: ICD-10-CM

## 2020-06-16 DIAGNOSIS — R06.00 DYSPNEA, UNSPECIFIED TYPE: ICD-10-CM

## 2020-06-16 DIAGNOSIS — R06.02 SHORTNESS OF BREATH: ICD-10-CM

## 2020-06-16 DIAGNOSIS — N18.30 CHRONIC KIDNEY DISEASE, STAGE III (MODERATE) (HCC): ICD-10-CM

## 2020-06-18 PROCEDURE — 93306 TTE W/DOPPLER COMPLETE: CPT | Performed by: INTERNAL MEDICINE

## 2020-06-25 DIAGNOSIS — E03.9 HYPOTHYROIDISM, UNSPECIFIED TYPE: ICD-10-CM

## 2020-06-25 DIAGNOSIS — R06.00 DYSPNEA, UNSPECIFIED TYPE: ICD-10-CM

## 2020-06-25 DIAGNOSIS — I10 ESSENTIAL HYPERTENSION: ICD-10-CM

## 2020-06-25 DIAGNOSIS — R06.02 SHORTNESS OF BREATH: ICD-10-CM

## 2020-06-25 DIAGNOSIS — R01.1 CARDIAC MURMUR: ICD-10-CM

## 2020-06-30 RX ORDER — ENALAPRIL MALEATE 20 MG/1
20 TABLET ORAL 2 TIMES DAILY
Qty: 180 TABLET | Refills: 1 | Status: SHIPPED | OUTPATIENT
Start: 2020-06-30 | End: 2020-10-12

## 2020-07-16 ENCOUNTER — OFFICE VISIT (OUTPATIENT)
Dept: FAMILY MEDICINE CLINIC | Facility: CLINIC | Age: 70
End: 2020-07-16

## 2020-07-16 VITALS
BODY MASS INDEX: 32.32 KG/M2 | HEART RATE: 81 BPM | WEIGHT: 182.4 LBS | SYSTOLIC BLOOD PRESSURE: 138 MMHG | TEMPERATURE: 98.2 F | HEIGHT: 63 IN | DIASTOLIC BLOOD PRESSURE: 84 MMHG | OXYGEN SATURATION: 93 %

## 2020-07-16 DIAGNOSIS — N18.30 CHRONIC KIDNEY DISEASE, STAGE III (MODERATE) (HCC): ICD-10-CM

## 2020-07-16 DIAGNOSIS — I10 ESSENTIAL HYPERTENSION: Primary | ICD-10-CM

## 2020-07-16 DIAGNOSIS — E11.9 TYPE 2 DIABETES MELLITUS WITHOUT COMPLICATION, WITH LONG-TERM CURRENT USE OF INSULIN (HCC): ICD-10-CM

## 2020-07-16 DIAGNOSIS — Z79.4 TYPE 2 DIABETES MELLITUS WITHOUT COMPLICATION, WITH LONG-TERM CURRENT USE OF INSULIN (HCC): ICD-10-CM

## 2020-07-16 PROCEDURE — 99213 OFFICE O/P EST LOW 20 MIN: CPT | Performed by: NURSE PRACTITIONER

## 2020-07-16 NOTE — PROGRESS NOTES
"    Renetta Mondragon is a 70 y.o. female.     70-year-old white female with history of hiatal hernia, depression, hypertension, type 2 diabetes, COPD, hypothyroidism, SIADH, chronic renal failure, bladder and rectal prolapse who comes in with caregiver today for follow-up visit.  Patient has form from a license [place] for handicap sticker which I filled out during exam  She states blood sugars are averaging less than 140 in the morning.  Blood pressure 138/84 heart rate 80 she denies any chest pain, dyspnea, tachycardia or dizziness weight is stable at 182  Patient blood work in June showed a creatinine of 1.84 when she normally has a normal creatinine I am rechecking that today along with a BNP due to dyspnea.  I did place her on [employer] last visit for dyspnea which she is doing 4 times a day and states it has helped a great deal with her dyspnea    Blood work today  Handicap parking sticker form filled out  Follow-up 3 months       The following portions of the patient's history were reviewed and updated as appropriate: allergies, current medications, past family history, past medical history, past social history, past surgical history and problem list.    Vitals:    07/16/20 1349   BP: 138/84   BP Location: Right arm   Patient Position: Sitting   Cuff Size: Adult   Pulse: 81   Temp: 98.2 °F (36.8 °C)   TempSrc: Temporal   SpO2: 93%   Weight: 82.7 kg (182 lb 6.4 oz)   Height: 158.8 cm (62.5\")     Body mass index is 32.83 kg/m².    Past Medical History:   Diagnosis Date   • Diabetes mellitus (CMS/HCC)    • Hyperlipidemia    • Hypertension    • Hypothyroidism      Past Surgical History:   Procedure Laterality Date   • HYSTERECTOMY       Family History   Family history unknown: Yes     Immunization History   Administered Date(s) Administered   • FLUAD TRI 65YR+ 10/02/2019   • Flu Vaccine Intradermal Quad 18-64YR 10/18/2006, 09/15/2015   • Fluad Quad 10/02/2019   • Fluzone High Dose =>65 Years ([employer] ONLY) " 09/12/2016, 09/20/2018   • Pneumococcal Conjugate 13-Valent (PCV13) 09/20/2017   • Pneumococcal Polysaccharide (PPSV23) 12/30/2019   • Zostavax 07/02/2012       Office Visit on 06/03/2020   Component Date Value Ref Range Status   • WBC 06/03/2020 9.9  3.4 - 10.8 x10E3/uL Final   • RBC 06/03/2020 3.97  3.77 - 5.28 x10E6/uL Final   • Hemoglobin 06/03/2020 12.5  11.1 - 15.9 g/dL Final   • Hematocrit 06/03/2020 38.3  34.0 - 46.6 % Final   • MCV 06/03/2020 97  79 - 97 fL Final   • MCH 06/03/2020 31.5  26.6 - 33.0 pg Final   • MCHC 06/03/2020 32.6  31.5 - 35.7 g/dL Final   • RDW 06/03/2020 12.3  11.7 - 15.4 % Final   • Platelets 06/03/2020 393  150 - 450 x10E3/uL Final   • Neutrophil Rel % 06/03/2020 71  Not Estab. % Final   • Lymphocyte Rel % 06/03/2020 15  Not Estab. % Final   • Monocyte Rel % 06/03/2020 10  Not Estab. % Final   • Eosinophil Rel % 06/03/2020 2  Not Estab. % Final   • Basophil Rel % 06/03/2020 1  Not Estab. % Final   • Neutrophils Absolute 06/03/2020 7.1* 1.4 - 7.0 x10E3/uL Final   • Lymphocytes Absolute 06/03/2020 1.5  0.7 - 3.1 x10E3/uL Final   • Monocytes Absolute 06/03/2020 1.0* 0.1 - 0.9 x10E3/uL Final   • Eosinophils Absolute 06/03/2020 0.2  0.0 - 0.4 x10E3/uL Final   • Basophils Absolute 06/03/2020 0.1  0.0 - 0.2 x10E3/uL Final   • Immature Granulocyte Rel % 06/03/2020 1  Not Estab. % Final   • Immature Grans Absolute 06/03/2020 0.1  0.0 - 0.1 x10E3/uL Final   • Glucose 06/03/2020 123* 65 - 99 mg/dL Final   • BUN 06/03/2020 31* 8 - 27 mg/dL Final   • Creatinine 06/03/2020 1.84* 0.57 - 1.00 mg/dL Final   • eGFR Non African Am 06/03/2020 27* >59 mL/min/1.73 Final   • eGFR African Am 06/03/2020 32* >59 mL/min/1.73 Final   • BUN/Creatinine Ratio 06/03/2020 17  12 - 28 Final   • Sodium 06/03/2020 133* 134 - 144 mmol/L Final   • Potassium 06/03/2020 5.0  3.5 - 5.2 mmol/L Final   • Chloride 06/03/2020 95* 96 - 106 mmol/L Final   • Total CO2 06/03/2020 23  20 - 29 mmol/L Final   • Calcium 06/03/2020 10.1   8.7 - 10.3 mg/dL Final   • Total Protein 06/03/2020 7.3  6.0 - 8.5 g/dL Final   • Albumin 06/03/2020 4.2  3.8 - 4.8 g/dL Final   • Globulin 06/03/2020 3.1  1.5 - 4.5 g/dL Final   • A/G Ratio 06/03/2020 1.4  1.2 - 2.2 Final   • Total Bilirubin 06/03/2020 <0.2  0.0 - 1.2 mg/dL Final   • Alkaline Phosphatase 06/03/2020 22* 39 - 117 IU/L Final   • AST (SGOT) 06/03/2020 15  0 - 40 IU/L Final   • ALT (SGPT) 06/03/2020 13  0 - 32 IU/L Final   • Hemoglobin A1C 06/03/2020 7.5* 4.8 - 5.6 % Final    Comment:          Prediabetes: 5.7 - 6.4           Diabetes: >6.4           Glycemic control for adults with diabetes: <7.0     • Total Cholesterol 06/03/2020 146  100 - 199 mg/dL Final   • Triglycerides 06/03/2020 113  0 - 149 mg/dL Final   • HDL Cholesterol 06/03/2020 52  >39 mg/dL Final   • VLDL Cholesterol 06/03/2020 23  5 - 40 mg/dL Final   • LDL Cholesterol  06/03/2020 71  0 - 99 mg/dL Final   • LDL/HDL Ratio 06/03/2020 1.4  0.0 - 3.2 ratio Final    Comment:                                     LDL/HDL Ratio                                              Men  Women                                1/2 Avg.Risk  1.0    1.5                                    Avg.Risk  3.6    3.2                                 2X Avg.Risk  6.2    5.0                                 3X Avg.Risk  8.0    6.1     • T3, Total 06/03/2020 81  71 - 180 ng/dL Final   • TSH 06/03/2020 3.450  0.450 - 4.500 uIU/mL Final   • Free T4 06/03/2020 1.31  0.82 - 1.77 ng/dL Final         Review of Systems   Constitutional: Negative.    HENT: Negative.    Respiratory: Negative.    Cardiovascular: Negative.    Gastrointestinal: Negative.    Genitourinary: Negative.    Musculoskeletal: Negative.    Skin: Negative.    Neurological: Negative.    Psychiatric/Behavioral: Negative.        Objective   Physical Exam   Constitutional: She is oriented to person, place, and time. She appears well-developed and well-nourished.   Cardiovascular: Normal rate and regular rhythm.    Pulmonary/Chest: Effort normal and breath sounds normal.   Abdominal: Soft. Bowel sounds are normal.   Musculoskeletal: Normal range of motion.   Neurological: She is alert and oriented to person, place, and time.   Slightly mentally challenged   Skin: Skin is warm.   Psychiatric: She has a normal mood and affect.       Procedures    Assessment/Plan   Renetta was seen today for hypertension.    Diagnoses and all orders for this visit:    Essential hypertension    Type 2 diabetes mellitus without complication, with long-term current use of insulin (CMS/[facility])    Chronic kidney disease, stage III (moderate) (CMS/[facility])          Current Outpatient Medications:   •  albuterol sulfate HFA (Ventolin HFA) 108 (90 Base) MCG/ACT inhaler, Inhale 2 puffs Every 4 (Four) Hours As Needed for Wheezing., Disp: 1 inhaler, Rfl: 6  •  Blood Glucose Monitoring Suppl (TRUE TRACK BLOOD GLUCOSE) device, Check BS twice a day DX Code E11.9, Disp: , Rfl:   •  busPIRone (BUSPAR) 10 MG tablet, Take 1 tablet by mouth 3 (Three) Times a Day., Disp: 90 tablet, Rfl: 2  •  calcium carb-cholecalciferol (CALCIUM 600/VITAMIN D3) 600-800 MG-UNIT tablet, Take 1 tablet by mouth Daily., Disp: 90 tablet, Rfl: 3  •  diclofenac (VOLTAREN) 1 % gel gel, Apply 4 g topically to the appropriate area as directed 4 (Four) Times a Day., Disp: 1 tube, Rfl: 2  •  enalapril (VASOTEC) 20 MG tablet, Take 1 tablet by mouth 2 (Two) Times a Day., Disp: 180 tablet, Rfl: 1  •  fenofibrate (TRICOR) 145 MG tablet, Take 1 tablet by mouth every night at bedtime., Disp: 90 tablet, Rfl: 1  •  glucose blood test strip, Check blood sugar twice daily, Disp: 100 each, Rfl: 6  •  INVEGA SUSTENNA 234 MG/1.5ML suspension prefilled syringe IM injection, inject 1.5ml IM every month, Disp: , Rfl: 2  •  ipratropium-albuterol (DUO-NEB) 0.5-2.5 mg/3 ml nebulizer, Take 3 mL by nebulization Every 6 (Six) Hours As Needed for Wheezing or Shortness of Air., Disp: 360 mL, Rfl: 1  •  levothyroxine  (SYNTHROID, LEVOTHROID) 112 MCG tablet, Take 1 tablet by mouth Daily., Disp: 30 tablet, Rfl: 2  •  LORazepam (ATIVAN) 0.5 MG tablet, Take 1 tablet by mouth every night at bedtime., Disp: 30 tablet, Rfl: 2  •  LUMIGAN 0.01 % ophthalmic drops, Administer 1 drop to both eyes every night at bedtime., Disp: , Rfl: 11  •  metFORMIN (GLUCOPHAGE) 500 MG tablet, Take 1.5 tablets by mouth in the morning and 1 tablet in the evening, Disp: 225 tablet, Rfl: 1  •  metoprolol succinate XL (TOPROL-XL) 100 MG 24 hr tablet, Take 1 tablet by mouth in the morning, Disp: 30 tablet, Rfl: 2  •  metoprolol succinate XL (TOPROL-XL) 50 MG 24 hr tablet, Take 1 tablet by mouth Daily., Disp: 30 tablet, Rfl: 3  •  OLANZapine (zyPREXA) 20 MG tablet, Take 1 tablet by mouth every night at bedtime., Disp: 30 tablet, Rfl: 2  •  OXcarbazepine (Trileptal) 150 MG tablet, Take 1 tablet by mouth every night at bedtime., Disp: 30 tablet, Rfl: 2  •  pravastatin (PRAVACHOL) 10 MG tablet, Take 1 tablet by mouth Daily., Disp: 30 tablet, Rfl: 2  •  traZODone (DESYREL) 150 MG tablet, Take 1 tablet by mouth every night at bedtime., Disp: 30 tablet, Rfl: 2  •  TRUEPLUS LANCETS 30G misc, Check BS twice a day DX Code E11.9, Disp: , Rfl:   •  verapamil SR (CALAN-SR) 240 MG CR tablet, Take 1 tablet by mouth Daily., Disp: 30 tablet, Rfl: 2

## 2020-07-17 LAB
BUN SERPL-MCNC: 16 MG/DL (ref 8–27)
BUN/CREAT SERPL: 21 (ref 12–28)
CALCIUM SERPL-MCNC: 9.7 MG/DL (ref 8.7–10.3)
CHLORIDE SERPL-SCNC: 99 MMOL/L (ref 96–106)
CO2 SERPL-SCNC: 26 MMOL/L (ref 20–29)
CREAT SERPL-MCNC: 0.78 MG/DL (ref 0.57–1)
GLUCOSE SERPL-MCNC: 94 MG/DL (ref 65–99)
NT-PROBNP SERPL-MCNC: 125 PG/ML (ref 0–301)
POTASSIUM SERPL-SCNC: 4.8 MMOL/L (ref 3.5–5.2)
SODIUM SERPL-SCNC: 134 MMOL/L (ref 134–144)

## 2020-07-20 RX ORDER — ALBUTEROL SULFATE 90 UG/1
AEROSOL, METERED RESPIRATORY (INHALATION)
Qty: 8.5 G | Refills: 6 | Status: SHIPPED | OUTPATIENT
Start: 2020-07-20

## 2020-07-21 ENCOUNTER — TELEPHONE (OUTPATIENT)
Dept: FAMILY MEDICINE CLINIC | Facility: CLINIC | Age: 70
End: 2020-07-21

## 2020-08-01 RX ORDER — LORAZEPAM 0.5 MG/1
0.5 TABLET ORAL
Qty: 30 TABLET | Refills: 2 | Status: SHIPPED | OUTPATIENT
Start: 2020-08-01 | End: 2020-10-28

## 2020-09-01 ENCOUNTER — TELEPHONE (OUTPATIENT)
Dept: FAMILY MEDICINE CLINIC | Facility: CLINIC | Age: 70
End: 2020-09-01

## 2020-09-01 RX ORDER — BIMATOPROST 0.01 %
1 DROPS OPHTHALMIC (EYE)
Qty: 7.5 ML | Refills: 11 | Status: SHIPPED | OUTPATIENT
Start: 2020-09-01

## 2020-09-01 RX ORDER — GLUCOSAM/CHON-MSM1/C/MANG/BOSW 500-416.6
1 TABLET ORAL 2 TIMES DAILY
Qty: 100 EACH | Refills: 11 | Status: SHIPPED | OUTPATIENT
Start: 2020-09-01

## 2020-09-22 ENCOUNTER — TELEPHONE (OUTPATIENT)
Dept: FAMILY MEDICINE CLINIC | Facility: CLINIC | Age: 70
End: 2020-09-22

## 2020-09-22 NOTE — TELEPHONE ENCOUNTER
Podiatry is faxing over an order for diabetic shoes.  [facility] called to see if we had received it.

## 2020-09-30 PROBLEM — E11.69 ONYCHOMYCOSIS OF MULTIPLE TOENAILS WITH TYPE 2 DIABETES MELLITUS (HCC): Status: ACTIVE | Noted: 2020-09-30

## 2020-09-30 PROBLEM — B35.1 ONYCHOMYCOSIS OF MULTIPLE TOENAILS WITH TYPE 2 DIABETES MELLITUS (HCC): Status: ACTIVE | Noted: 2020-09-30

## 2020-09-30 RX ORDER — PRAVASTATIN SODIUM 10 MG
10 TABLET ORAL DAILY
Qty: 90 TABLET | Refills: 1 | Status: SHIPPED | OUTPATIENT
Start: 2020-09-30 | End: 2021-01-27

## 2020-10-12 RX ORDER — ENALAPRIL MALEATE 20 MG/1
TABLET ORAL
Qty: 180 TABLET | Refills: 1 | Status: SHIPPED | OUTPATIENT
Start: 2020-10-12 | End: 2021-02-17 | Stop reason: SDUPTHER

## 2020-10-20 RX ORDER — METOPROLOL SUCCINATE 100 MG/1
TABLET, EXTENDED RELEASE ORAL
Qty: 30 TABLET | Refills: 2 | Status: SHIPPED | OUTPATIENT
Start: 2020-10-20 | End: 2020-12-15

## 2020-10-20 RX ORDER — LEVOTHYROXINE SODIUM 112 UG/1
112 TABLET ORAL DAILY
Qty: 30 TABLET | Refills: 2 | Status: SHIPPED | OUTPATIENT
Start: 2020-10-20 | End: 2020-12-15

## 2020-10-22 RX ORDER — IPRATROPIUM BROMIDE AND ALBUTEROL SULFATE 2.5; .5 MG/3ML; MG/3ML
SOLUTION RESPIRATORY (INHALATION)
Qty: 360 ML | Refills: 1 | Status: SHIPPED | OUTPATIENT
Start: 2020-10-22

## 2020-10-28 DIAGNOSIS — Z12.31 OTHER SCREENING MAMMOGRAM: ICD-10-CM

## 2020-10-28 RX ORDER — LORAZEPAM 0.5 MG/1
0.5 TABLET ORAL
Qty: 30 TABLET | Refills: 0 | Status: SHIPPED | OUTPATIENT
Start: 2020-10-28

## 2020-11-10 RX ORDER — METOPROLOL SUCCINATE 50 MG/1
50 TABLET, EXTENDED RELEASE ORAL DAILY
Qty: 30 TABLET | Refills: 3 | Status: SHIPPED | OUTPATIENT
Start: 2020-11-10 | End: 2021-03-22

## 2020-11-17 ENCOUNTER — OFFICE VISIT (OUTPATIENT)
Dept: FAMILY MEDICINE CLINIC | Facility: CLINIC | Age: 70
End: 2020-11-17

## 2020-11-17 VITALS
OXYGEN SATURATION: 94 % | HEART RATE: 85 BPM | HEIGHT: 63 IN | TEMPERATURE: 97.3 F | WEIGHT: 174 LBS | BODY MASS INDEX: 30.83 KG/M2 | DIASTOLIC BLOOD PRESSURE: 78 MMHG | SYSTOLIC BLOOD PRESSURE: 121 MMHG

## 2020-11-17 DIAGNOSIS — G61.89 OTHER INFLAMMATORY POLYNEUROPATHIES (HCC): ICD-10-CM

## 2020-11-17 DIAGNOSIS — L84 PRE-ULCERATIVE CALLUSES: ICD-10-CM

## 2020-11-17 DIAGNOSIS — E11.40 TYPE 2 DIABETES MELLITUS WITH DIABETIC NEUROPATHY, WITHOUT LONG-TERM CURRENT USE OF INSULIN (HCC): Primary | ICD-10-CM

## 2020-11-17 PROBLEM — I70.90 ATHEROSCLEROSIS: Status: ACTIVE | Noted: 2020-11-17

## 2020-11-17 PROCEDURE — 99213 OFFICE O/P EST LOW 20 MIN: CPT | Performed by: NURSE PRACTITIONER

## 2020-11-17 RX ORDER — DORZOLAMIDE HYDROCHLORIDE AND TIMOLOL MALEATE 20; 5 MG/ML; MG/ML
1 SOLUTION/ DROPS OPHTHALMIC DAILY
COMMUNITY
Start: 2020-11-11

## 2020-11-17 NOTE — PROGRESS NOTES
"    Renetta Mondragon is a 70 y.o. female.     70-year-old white female with history of hiatal hernia, depression, hypertension, type 2 diabetes, COPD, hypothyroidism, SIADH, chronic renal failure, bladder and rectal prolapse who comes in with caregiver today requesting diabetic shoes and bringing a Medicare form to be filled out after examination.  Patient is a diabetic has neuropathy and has calluses on her greater toes which qualifies her for Medicare coverage for diabetic shoes.  Form will be faxed back to podiatry and patient will select shoes she wants  Blood pressure 120/78 heart rate 84 she denies any chest pain, dyspnea, tachycardia or dizziness  Patient is already had a flu shot and currently up-to-date on blood work  Weight is 174 with a BMI of 31.3    Form filled out for diabetic shoes  Follow-up 3 months for narcotic contract  Keep all appointments with podiatry       The following portions of the patient's history were reviewed and updated as appropriate: allergies, current medications, past family history, past medical history, past social history, past surgical history and problem list.    Vitals:    11/17/20 0942   BP: 121/78   BP Location: Right arm   Patient Position: Sitting   Cuff Size: Adult   Pulse: 85   Temp: 97.3 °F (36.3 °C)   TempSrc: Temporal   SpO2: 94%   Weight: 78.9 kg (174 lb)   Height: 158.8 cm (62.5\")     Body mass index is 31.32 kg/m².    Past Medical History:   Diagnosis Date   • Diabetes mellitus (CMS/HCC)    • Hyperlipidemia    • Hypertension    • Hypothyroidism      Past Surgical History:   Procedure Laterality Date   • HYSTERECTOMY       Family History   Family history unknown: Yes     Immunization History   Administered Date(s) Administered   • FLUAD TRI 65YR+ 10/02/2019   • Flu Vaccine Intradermal Quad 18-64YR 10/18/2006, 09/15/2015   • Fluad Quad 65+ 10/02/2019   • Fluzone High Dose =>65 Years ([employer] ONLY) 09/12/2016, 09/20/2018   • Pneumococcal Conjugate 13-Valent (PCV13) " 09/20/2017   • Pneumococcal Polysaccharide (PPSV23) 12/30/2019   • Zostavax 07/02/2012       Results Encounter on 06/16/2020   Component Date Value Ref Range Status   • Glucose 07/16/2020 94  65 - 99 mg/dL Final   • BUN 07/16/2020 16  8 - 27 mg/dL Final   • Creatinine 07/16/2020 0.78  0.57 - 1.00 mg/dL Final   • eGFR Non African Am 07/16/2020 77  >59 mL/min/1.73 Final   • eGFR African Am 07/16/2020 88  >59 mL/min/1.73 Final   • BUN/Creatinine Ratio 07/16/2020 21  12 - 28 Final   • Sodium 07/16/2020 134  134 - 144 mmol/L Final   • Potassium 07/16/2020 4.8  3.5 - 5.2 mmol/L Final   • Chloride 07/16/2020 99  96 - 106 mmol/L Final   • Total CO2 07/16/2020 26  20 - 29 mmol/L Final   • Calcium 07/16/2020 9.7  8.7 - 10.3 mg/dL Final   • proBNP 07/16/2020 125  0 - 301 pg/mL Final    Comment: The following cut-points have been suggested for the  use of proBNP for the diagnostic evaluation of heart  failure (HF) in patients with acute dyspnea:  Modality                     Age           Optimal Cut                             (years)            Point  ------------------------------------------------------  Diagnosis (rule in HF)        <50            450 pg/mL                            50 - 75            900 pg/mL                                >75           1800 pg/mL  Exclusion (rule out HF)  Age independent     300 pg/mL           Review of Systems   HENT: Negative.    Respiratory: Negative.    Cardiovascular: Negative.    Gastrointestinal: Negative.    Genitourinary: Negative.    Musculoskeletal: Negative.    Skin: Negative.    Neurological: Negative.    Psychiatric/Behavioral: Negative.        Objective   Physical Exam  Constitutional:       Appearance: Normal appearance. She is normal weight.   HENT:      Head: Normocephalic.   Neck:      Musculoskeletal: Normal range of motion.   Cardiovascular:      Rate and Rhythm: Normal rate and regular rhythm.      Pulses: Normal pulses.      Heart sounds: Normal heart sounds.    Pulmonary:      Effort: Pulmonary effort is normal.      Breath sounds: Normal breath sounds.   Musculoskeletal: Normal range of motion.      Comments: Patient very flat-footed   Skin:     General: Skin is warm and dry.   Neurological:      General: No focal deficit present.      Mental Status: She is oriented to person, place, and time.   Psychiatric:         Mood and Affect: Mood normal.         Behavior: Behavior normal.         Procedures    Assessment/Plan   Problems Addressed this Visit        Endocrine    Type 2 diabetes mellitus with diabetic neuropathy, without long-term current use of insulin (CMS/[facility]) - Primary      Other Visit Diagnoses     Other inflammatory polyneuropathies (CMS/[facility])        Pre-ulcerative calluses        BMI 31.0-31.9,adult          Diagnoses       Codes Comments    Type 2 diabetes mellitus with diabetic neuropathy, without long-term current use of insulin (CMS/[facility])    -  Primary ICD-10-CM: E11.40  ICD-9-CM: 250.60, 357.2     Other inflammatory polyneuropathies (CMS/[facility])     ICD-10-CM: G61.89  ICD-9-CM: 357.89     Pre-ulcerative calluses     ICD-10-CM: L84  ICD-9-CM: 700     BMI 31.0-31.9,adult     ICD-10-CM: Z68.31  ICD-9-CM: V85.31             Current Outpatient Medications:   •  ALBUTEROL SULFATE  (90 Base) MCG/ACT inhaler, inhale TWO puffs EVERY 4 HOURS AS NEEDED FOR wheezing, Disp: 8.5 g, Rfl: 6  •  Blood Glucose Monitoring Suppl (TRUE TRACK BLOOD GLUCOSE) device, Check BS twice a day DX Code E11.9, Disp: , Rfl:   •  busPIRone (BUSPAR) 10 MG tablet, Take 1 tablet by mouth 3 (Three) Times a Day., Disp: 90 tablet, Rfl: 2  •  calcium carb-cholecalciferol (CALCIUM 600/VITAMIN D3) 600-800 MG-UNIT tablet, Take 1 tablet by mouth Daily., Disp: 90 tablet, Rfl: 3  •  diclofenac (VOLTAREN) 1 % gel gel, Apply 4 g topically to the appropriate area as directed 4 (Four) Times a Day., Disp: 1 tube, Rfl: 2  •  dorzolamide-timolol (COSOPT) 22.3-6.8 MG/ML ophthalmic solution, Administer 1  drop to both eyes Daily., Disp: , Rfl:   •  enalapril (VASOTEC) 20 MG tablet, TAKE ONE TABLET BY MOUTH TWICE DAILY, Disp: 180 tablet, Rfl: 1  •  fenofibrate (TRICOR) 145 MG tablet, Take 1 tablet by mouth every night at bedtime., Disp: 90 tablet, Rfl: 1  •  glucose blood test strip, Check blood sugar twice daily, Disp: 100 each, Rfl: 6  •  INVEGA SUSTENNA 234 MG/1.5ML suspension prefilled syringe IM injection, inject 1.5ml IM every month, Disp: , Rfl: 2  •  ipratropium-albuterol (DUO-NEB) 0.5-2.5 mg/3 ml nebulizer, USE contents of ONE vial in nebulizer EVERY 6 HOURS AS NEEDED FOR wheezing OR shortness of air, Disp: 360 mL, Rfl: 1  •  levothyroxine (SYNTHROID, LEVOTHROID) 112 MCG tablet, Take 1 tablet by mouth Daily., Disp: 30 tablet, Rfl: 2  •  LORazepam (ATIVAN) 0.5 MG tablet, Take 1 tablet by mouth every night at bedtime., Disp: 30 tablet, Rfl: 0  •  LUMIGAN 0.01 % ophthalmic drops, Administer 1 drop to both eyes every night at bedtime., Disp: 7.5 mL, Rfl: 11  •  metFORMIN (GLUCOPHAGE) 500 MG tablet, Take 1.5 tablets by mouth in the morning and 1 tablet in the evening, Disp: 225 tablet, Rfl: 1  •  metoprolol succinate XL (TOPROL-XL) 100 MG 24 hr tablet, Take 1 tablet by mouth in the morning, Disp: 30 tablet, Rfl: 2  •  metoprolol succinate XL (TOPROL-XL) 50 MG 24 hr tablet, Take 1 tablet by mouth Daily., Disp: 30 tablet, Rfl: 3  •  OLANZapine (zyPREXA) 20 MG tablet, Take 1 tablet by mouth every night at bedtime., Disp: 30 tablet, Rfl: 2  •  OXcarbazepine (Trileptal) 150 MG tablet, Take 1 tablet by mouth every night at bedtime., Disp: 30 tablet, Rfl: 2  •  pravastatin (PRAVACHOL) 10 MG tablet, Take 1 tablet by mouth Daily., Disp: 90 tablet, Rfl: 1  •  traZODone (DESYREL) 150 MG tablet, Take 1 tablet by mouth every night at bedtime., Disp: 30 tablet, Rfl: 2  •  TRUEplus Lancets 30G misc, Inject 1 applicator under the skin into the appropriate area as directed 2 (Two) Times a Day. Check BS twice a day DX Code E11.9,  Disp: 100 each, Rfl: 11  •  verapamil SR (CALAN-SR) 240 MG CR tablet, Take 1 tablet by mouth Daily., Disp: 30 tablet, Rfl: 2

## 2020-12-03 RX ORDER — VERAPAMIL HYDROCHLORIDE 240 MG/1
240 TABLET, FILM COATED, EXTENDED RELEASE ORAL DAILY
Qty: 30 TABLET | Refills: 2 | Status: SHIPPED | OUTPATIENT
Start: 2020-12-03 | End: 2021-02-17 | Stop reason: SDUPTHER

## 2020-12-15 RX ORDER — LEVOTHYROXINE SODIUM 112 UG/1
TABLET ORAL
Qty: 30 TABLET | Refills: 2 | Status: SHIPPED | OUTPATIENT
Start: 2020-12-15 | End: 2021-03-08

## 2020-12-15 RX ORDER — METOPROLOL SUCCINATE 100 MG/1
TABLET, EXTENDED RELEASE ORAL
Qty: 30 TABLET | Refills: 2 | Status: SHIPPED | OUTPATIENT
Start: 2020-12-15 | End: 2021-03-08

## 2021-01-27 RX ORDER — PRAVASTATIN SODIUM 10 MG
10 TABLET ORAL DAILY
Qty: 90 TABLET | Refills: 1 | Status: SHIPPED | OUTPATIENT
Start: 2021-01-27

## 2021-02-16 ENCOUNTER — TELEPHONE (OUTPATIENT)
Dept: FAMILY MEDICINE CLINIC | Facility: CLINIC | Age: 71
End: 2021-02-16

## 2021-02-16 NOTE — TELEPHONE ENCOUNTER
KIP AT [facility] STATED SHE FAXED THE PRESCRIPTION REGARDING THE QTY INCREASE FOR PATIENTS PULL UP.      KIP STATED THE ORDER IS SCHEDULED TO GO OUT TODAY, BUT DUE TO INCLEMENT WEATHER AND THE PRESCRIPTION ORDER.    PLEASE CALL KIP AT [facility] FOR ANY QUESTIONS.    [phone]

## 2021-02-17 ENCOUNTER — OFFICE VISIT (OUTPATIENT)
Dept: FAMILY MEDICINE CLINIC | Facility: CLINIC | Age: 71
End: 2021-02-17

## 2021-02-17 DIAGNOSIS — E78.5 HYPERLIPIDEMIA, UNSPECIFIED HYPERLIPIDEMIA TYPE: Primary | ICD-10-CM

## 2021-02-17 DIAGNOSIS — E03.9 HYPOTHYROIDISM, UNSPECIFIED TYPE: ICD-10-CM

## 2021-02-17 DIAGNOSIS — E11.40 TYPE 2 DIABETES MELLITUS WITH DIABETIC NEUROPATHY, WITHOUT LONG-TERM CURRENT USE OF INSULIN (HCC): ICD-10-CM

## 2021-02-17 DIAGNOSIS — Z00.00 PREVENTATIVE HEALTH CARE: ICD-10-CM

## 2021-02-17 PROCEDURE — 99213 OFFICE O/P EST LOW 20 MIN: CPT | Performed by: NURSE PRACTITIONER

## 2021-02-17 RX ORDER — ENALAPRIL MALEATE 20 MG/1
20 TABLET ORAL 2 TIMES DAILY
Qty: 180 TABLET | Refills: 1 | Status: SHIPPED | OUTPATIENT
Start: 2021-02-17 | End: 2021-04-27 | Stop reason: SDUPTHER

## 2021-02-17 RX ORDER — VERAPAMIL HYDROCHLORIDE 240 MG/1
240 TABLET, FILM COATED, EXTENDED RELEASE ORAL DAILY
Qty: 30 TABLET | Refills: 2 | Status: SHIPPED | OUTPATIENT
Start: 2021-02-17 | End: 2021-04-27 | Stop reason: SDUPTHER

## 2021-02-17 NOTE — PATIENT INSTRUCTIONS
Schedule fasting blood work in 3 months follow-up visit in office  Call if spot on back continues to bleed  Call office if tocometer still will not work after putting in new batteries

## 2021-02-17 NOTE — PROGRESS NOTES
Renetta Mondragon is a 70 y.o. female.     You have chosen to receive care through a telephone visit. Do you consent to use a telephone visit for your medical care today? Yes    70-year-old white female with history of hiatal hernia, depression, hypertension, type 2 diabetes, COPD, hypothyroidism, if I 88, chronic renal failure, bladder and rectal prolapse who calls in today for 3-month follow-up telephone visit  Patient states she is doing well she has been keeping her visits with podiatry and has gotten diabetic shoes however they were the wrong size that had to be sent back and she is awaiting the new pair  She states her  blood sugars are normally less than 150 in the morning however her glucometer has quit working and if changing the batteries does not help I will reorder her another glucometer  She complains of a spot on her back she does not know if it is a mole or what it is keeps getting knocked off and bleeding.  I told her her next visit she needs to come in so I can get a look at what this is  Patient states she is doing well on her medications and is sleeping very well    Schedule fasting blood work  Schedule III month follow-up  Call office if problems back continues to bleed   Call office if glucometer still will work with new batteries         The following portions of the patient's history were reviewed and updated as appropriate: allergies, current medications, past family history, past medical history, past social history, past surgical history and problem list.    There were no vitals filed for this visit.  There is no height or weight on file to calculate BMI.    Past Medical History:   Diagnosis Date   • Diabetes mellitus (CMS/HCC)    • Hyperlipidemia    • Hypertension    • Hypothyroidism      Past Surgical History:   Procedure Laterality Date   • HYSTERECTOMY       Family History   Family history unknown: Yes     Immunization History   Administered Date(s) Administered   • FLUAD TRI 65YR+  10/02/2019   • Flu Vaccine Intradermal Quad 18-64YR 10/18/2006, 09/15/2015   • Fluad Quad 65+ 10/02/2019   • Fluzone High Dose =>65 Years ([employer] ONLY) 09/12/2016, 09/20/2018   • Pneumococcal Conjugate 13-Valent (PCV13) 09/20/2017   • Pneumococcal Polysaccharide (PPSV23) 12/30/2019   • Zostavax 07/02/2012       Results Encounter on 06/16/2020   Component Date Value Ref Range Status   • Glucose 07/16/2020 94  65 - 99 mg/dL Final   • BUN 07/16/2020 16  8 - 27 mg/dL Final   • Creatinine 07/16/2020 0.78  0.57 - 1.00 mg/dL Final   • eGFR Non African Am 07/16/2020 77  >59 mL/min/1.73 Final   • eGFR African Am 07/16/2020 88  >59 mL/min/1.73 Final   • BUN/Creatinine Ratio 07/16/2020 21  12 - 28 Final   • Sodium 07/16/2020 134  134 - 144 mmol/L Final   • Potassium 07/16/2020 4.8  3.5 - 5.2 mmol/L Final   • Chloride 07/16/2020 99  96 - 106 mmol/L Final   • Total CO2 07/16/2020 26  20 - 29 mmol/L Final   • Calcium 07/16/2020 9.7  8.7 - 10.3 mg/dL Final   • proBNP 07/16/2020 125  0 - 301 pg/mL Final    Comment: The following cut-points have been suggested for the  use of proBNP for the diagnostic evaluation of heart  failure (HF) in patients with acute dyspnea:  Modality                     Age           Optimal Cut                             (years)            Point  ------------------------------------------------------  Diagnosis (rule in HF)        <50            450 pg/mL                            50 - 75            900 pg/mL                                >75           1800 pg/mL  Exclusion (rule out HF)  Age independent     300 pg/mL           Review of Systems   Constitutional: Negative.    HENT: Negative.    Respiratory: Negative.    Cardiovascular: Negative.    Gastrointestinal: Negative.    Genitourinary: Negative.    Musculoskeletal: Negative.    Skin: Positive for skin lesions.   Neurological: Negative.    Psychiatric/Behavioral: Negative.        Objective   Physical Exam  Constitutional:       Appearance:  Normal appearance.   HENT:      Head: Normocephalic.   Neck:      Musculoskeletal: Normal range of motion.   Cardiovascular:      Rate and Rhythm: Normal rate and regular rhythm.      Pulses: Normal pulses.      Heart sounds: Normal heart sounds.   Pulmonary:      Effort: Pulmonary effort is normal.      Breath sounds: Normal breath sounds.   Abdominal:      General: Bowel sounds are normal.   Musculoskeletal: Normal range of motion.   Skin:     General: Skin is warm and dry.   Neurological:      General: No focal deficit present.      Mental Status: She is alert and oriented to person, place, and time.   Psychiatric:         Mood and Affect: Mood normal.         Behavior: Behavior normal.         Procedures    Assessment/Plan   Diagnoses and all orders for this visit:    1. Hyperlipidemia, unspecified hyperlipidemia type (Primary)  -     Cancel: Lipid Panel With LDL / HDL Ratio  -     Lipid Panel With LDL / HDL Ratio; Future    2. Hypothyroidism, unspecified type  -     Cancel: TSH+Free T4  -     Cancel: T3  -     TSH+Free T4; Future  -     T3; Future    3. Type 2 diabetes mellitus with diabetic neuropathy, without long-term current use of insulin (CMS/[facility])  -     Cancel: Comprehensive Metabolic Panel  -     Cancel: Hemoglobin A1c  -     Comprehensive Metabolic Panel; Future  -     Hemoglobin A1c; Future    4. Preventative health care  -     Cancel: CBC & Differential  -     CBC & Differential; Future    Other orders  -     verapamil SR (CALAN-SR) 240 MG CR tablet; Take 1 tablet by mouth Daily.  Dispense: 30 tablet; Refill: 2  -     enalapril (VASOTEC) 20 MG tablet; Take 1 tablet by mouth 2 (Two) Times a Day.  Dispense: 180 tablet; Refill: 1          Current Outpatient Medications:   •  ALBUTEROL SULFATE  (90 Base) MCG/ACT inhaler, inhale TWO puffs EVERY 4 HOURS AS NEEDED FOR wheezing, Disp: 8.5 g, Rfl: 6  •  Blood Glucose Monitoring Suppl (TRUE TRACK BLOOD GLUCOSE) device, Check BS twice a day DX Code  E11.9, Disp: , Rfl:   •  busPIRone (BUSPAR) 10 MG tablet, Take 1 tablet by mouth 3 (Three) Times a Day., Disp: 90 tablet, Rfl: 2  •  calcium carbonate-vitamin d 600-400 MG-UNIT per tablet, TAKE ONE TABLET BY MOUTH EVERY DAY, Disp: 90 tablet, Rfl: 3  •  diclofenac (VOLTAREN) 1 % gel gel, Apply 4 g topically to the appropriate area as directed 4 (Four) Times a Day., Disp: 1 tube, Rfl: 2  •  dorzolamide-timolol (COSOPT) 22.3-6.8 MG/ML ophthalmic solution, Administer 1 drop to both eyes Daily., Disp: , Rfl:   •  enalapril (VASOTEC) 20 MG tablet, Take 1 tablet by mouth 2 (Two) Times a Day., Disp: 180 tablet, Rfl: 1  •  fenofibrate (TRICOR) 145 MG tablet, Take 1 tablet by mouth every night at bedtime., Disp: 90 tablet, Rfl: 1  •  glucose blood test strip, Check blood sugar twice daily, Disp: 100 each, Rfl: 6  •  INVEGA SUSTENNA 234 MG/1.5ML suspension prefilled syringe IM injection, inject 1.5ml IM every month, Disp: , Rfl: 2  •  ipratropium-albuterol (DUO-NEB) 0.5-2.5 mg/3 ml nebulizer, USE contents of ONE vial in nebulizer EVERY 6 HOURS AS NEEDED FOR wheezing OR shortness of air, Disp: 360 mL, Rfl: 1  •  levothyroxine (SYNTHROID, LEVOTHROID) 112 MCG tablet, TAKE ONE TABLET BY MOUTH EVERY DAY, Disp: 30 tablet, Rfl: 2  •  LORazepam (ATIVAN) 0.5 MG tablet, Take 1 tablet by mouth every night at bedtime., Disp: 30 tablet, Rfl: 0  •  LUMIGAN 0.01 % ophthalmic drops, Administer 1 drop to both eyes every night at bedtime., Disp: 7.5 mL, Rfl: 11  •  metFORMIN (GLUCOPHAGE) 500 MG tablet, Take 1.5 tablets by mouth in the morning and 1 tablet in the evening, Disp: 225 tablet, Rfl: 1  •  metoprolol succinate XL (TOPROL-XL) 100 MG 24 hr tablet, Take 1 tablet by mouth in the morning, Disp: 30 tablet, Rfl: 2  •  metoprolol succinate XL (TOPROL-XL) 50 MG 24 hr tablet, Take 1 tablet by mouth Daily., Disp: 30 tablet, Rfl: 3  •  OLANZapine (zyPREXA) 20 MG tablet, Take 1 tablet by mouth every night at bedtime., Disp: 30 tablet, Rfl: 2  •   OXcarbazepine (Trileptal) 150 MG tablet, Take 1 tablet by mouth every night at bedtime., Disp: 30 tablet, Rfl: 2  •  pravastatin (PRAVACHOL) 10 MG tablet, Take 1 tablet by mouth Daily., Disp: 90 tablet, Rfl: 1  •  traZODone (DESYREL) 150 MG tablet, Take 1 tablet by mouth every night at bedtime., Disp: 30 tablet, Rfl: 2  •  TRUEplus Lancets 30G misc, Inject 1 applicator under the skin into the appropriate area as directed 2 (Two) Times a Day. Check BS twice a day DX Code E11.9, Disp: 100 each, Rfl: 11  •  verapamil SR (CALAN-SR) 240 MG CR tablet, Take 1 tablet by mouth Daily., Disp: 30 tablet, Rfl: 2

## 2021-03-01 RX ORDER — FENOFIBRATE 145 MG/1
145 TABLET, COATED ORAL
Qty: 90 TABLET | Refills: 1 | Status: SHIPPED | OUTPATIENT
Start: 2021-03-01

## 2021-03-08 RX ORDER — LEVOTHYROXINE SODIUM 112 UG/1
TABLET ORAL
Qty: 30 TABLET | Refills: 2 | Status: SHIPPED | OUTPATIENT
Start: 2021-03-08

## 2021-03-08 RX ORDER — METOPROLOL SUCCINATE 100 MG/1
TABLET, EXTENDED RELEASE ORAL
Qty: 30 TABLET | Refills: 2 | Status: SHIPPED | OUTPATIENT
Start: 2021-03-08

## 2021-03-16 ENCOUNTER — TELEPHONE (OUTPATIENT)
Dept: FAMILY MEDICINE CLINIC | Facility: CLINIC | Age: 71
End: 2021-03-16

## 2021-03-16 NOTE — TELEPHONE ENCOUNTER
Kathia, from [facility], states the patient just told her that she had her license revoked in 2018 due to medical concerns. Patient has been driving and Kathia needs advice on what to do.    Please call at [phone].

## 2021-03-22 RX ORDER — METOPROLOL SUCCINATE 50 MG/1
50 TABLET, EXTENDED RELEASE ORAL DAILY
Qty: 30 TABLET | Refills: 3 | Status: SHIPPED | OUTPATIENT
Start: 2021-03-22 | End: 2021-04-28 | Stop reason: SDUPTHER

## 2021-03-29 ENCOUNTER — TELEPHONE (OUTPATIENT)
Dept: FAMILY MEDICINE CLINIC | Facility: CLINIC | Age: 71
End: 2021-03-29

## 2021-03-30 ENCOUNTER — OFFICE VISIT (OUTPATIENT)
Dept: FAMILY MEDICINE CLINIC | Facility: CLINIC | Age: 71
End: 2021-03-30

## 2021-03-30 VITALS
BODY MASS INDEX: 32.43 KG/M2 | WEIGHT: 183 LBS | HEIGHT: 63 IN | DIASTOLIC BLOOD PRESSURE: 75 MMHG | OXYGEN SATURATION: 94 % | TEMPERATURE: 96.4 F | HEART RATE: 91 BPM | SYSTOLIC BLOOD PRESSURE: 122 MMHG

## 2021-03-30 DIAGNOSIS — E11.40 TYPE 2 DIABETES MELLITUS WITH DIABETIC NEUROPATHY, WITHOUT LONG-TERM CURRENT USE OF INSULIN (HCC): ICD-10-CM

## 2021-03-30 DIAGNOSIS — R06.02 SHORTNESS OF BREATH: Primary | ICD-10-CM

## 2021-03-30 DIAGNOSIS — R41.0 CONFUSION: ICD-10-CM

## 2021-03-30 PROCEDURE — 81003 URINALYSIS AUTO W/O SCOPE: CPT | Performed by: NURSE PRACTITIONER

## 2021-03-30 PROCEDURE — 99214 OFFICE O/P EST MOD 30 MIN: CPT | Performed by: NURSE PRACTITIONER

## 2021-03-30 RX ORDER — BUDESONIDE AND FORMOTEROL FUMARATE DIHYDRATE 160; 4.5 UG/1; UG/1
2 AEROSOL RESPIRATORY (INHALATION)
Qty: 10.2 G | Refills: 12 | Status: SHIPPED | OUTPATIENT
Start: 2021-03-30

## 2021-03-30 NOTE — PROGRESS NOTES
"    Renetta Mondragon is a 70 y.o. female.     70-year-old white female with history of hiatal hernia, depression, hypertension, type 2 diabetes, COPD, hypothyroidism, bladder and rectal prolapse who comes in today with caregiver with complaints of confusion.  Patient was unable to get urine sample today we are sending her home with supplies to do that and I am doing a chest x-ray today to rule out any infectious process.  Lungs are diminished but she has no cough.  If these tests are negative I recommended they go back to psychiatry to see if he wants to rearrange her medicines.  Patient lives alone but does have caregivers coming in and out throughout the day.  She states she sleeps well  Blood pressure 122/74 heart rate 90 she denies any chest pain, dyspnea, tachycardia or dizziness  Patient states her blood sugar this morning was 125 and her sugars normally do pretty well on blood work  Caregiver also states she has been short of breath her O2 sat was 94%.  She does have COPD and states she is using her nebulizer but caregivers are not sure I am adding a steroid inhaler    Schedule fasting blood work  Stop trazodone  Symbicort inhaler twice a day  Chest x-ray  Follow-up with psychiatry       The following portions of the patient's history were reviewed and updated as appropriate: allergies, current medications, past family history, past medical history, past social history, past surgical history and problem list.    Vitals:    03/30/21 1018   BP: 122/75   BP Location: Right arm   Patient Position: Sitting   Cuff Size: Adult   Pulse: 91   Temp: 96.4 °F (35.8 °C)   TempSrc: Temporal   SpO2: 94%   Weight: 83 kg (183 lb)   Height: 158.8 cm (62.5\")     Body mass index is 32.94 kg/m².    Past Medical History:   Diagnosis Date   • Cigarette smoker 3/23/2015   • Diabetes mellitus (CMS/[facility])    • Hyperlipidemia    • Hypertension    • Hypothyroidism      Past Surgical History:   Procedure Laterality Date   • HYSTERECTOMY   "     Family History   Family history unknown: Yes     Immunization History   Administered Date(s) Administered   • FLUAD TRI 65YR+ 10/02/2019   • Flu Vaccine Intradermal Quad 18-64YR 10/18/2006, 09/15/2015   • Fluad Quad 65+ 10/02/2019   • Fluzone High Dose =>65 Years ([employer] ONLY) 09/12/2016, 09/20/2018   • Pneumococcal Conjugate 13-Valent (PCV13) 09/20/2017   • Pneumococcal Polysaccharide (PPSV23) 12/30/2019   • Zostavax 07/02/2012       Results Encounter on 06/16/2020   Component Date Value Ref Range Status   • Glucose 07/16/2020 94  65 - 99 mg/dL Final   • BUN 07/16/2020 16  8 - 27 mg/dL Final   • Creatinine 07/16/2020 0.78  0.57 - 1.00 mg/dL Final   • eGFR Non African Am 07/16/2020 77  >59 mL/min/1.73 Final   • eGFR African Am 07/16/2020 88  >59 mL/min/1.73 Final   • BUN/Creatinine Ratio 07/16/2020 21  12 - 28 Final   • Sodium 07/16/2020 134  134 - 144 mmol/L Final   • Potassium 07/16/2020 4.8  3.5 - 5.2 mmol/L Final   • Chloride 07/16/2020 99  96 - 106 mmol/L Final   • Total CO2 07/16/2020 26  20 - 29 mmol/L Final   • Calcium 07/16/2020 9.7  8.7 - 10.3 mg/dL Final   • proBNP 07/16/2020 125  0 - 301 pg/mL Final    Comment: The following cut-points have been suggested for the  use of proBNP for the diagnostic evaluation of heart  failure (HF) in patients with acute dyspnea:  Modality                     Age           Optimal Cut                             (years)            Point  ------------------------------------------------------  Diagnosis (rule in HF)        <50            450 pg/mL                            50 - 75            900 pg/mL                                >75           1800 pg/mL  Exclusion (rule out HF)  Age independent     300 pg/mL           Review of Systems   Constitutional: Negative.    HENT: Negative.    Respiratory: Negative.    Cardiovascular: Negative.    Gastrointestinal: Negative.    Genitourinary: Negative.    Musculoskeletal: Negative.    Neurological: Positive for confusion.        Objective   Physical Exam  Constitutional:       Appearance: Normal appearance.   Cardiovascular:      Rate and Rhythm: Normal rate and regular rhythm.      Pulses: Normal pulses.      Heart sounds: Normal heart sounds.   Pulmonary:      Effort: Pulmonary effort is normal.      Comments: Lungs diminished  Abdominal:      General: Bowel sounds are normal.   Musculoskeletal:         General: Normal range of motion.      Cervical back: Normal range of motion.   Skin:     General: Skin is warm.   Neurological:      General: No focal deficit present.      Mental Status: She is alert.      Comments: Will most part oriented does have some memory issues   Psychiatric:         Mood and Affect: Mood normal.         Behavior: Behavior normal.         Procedures    Assessment/Plan   Diagnoses and all orders for this visit:    1. Shortness of breath (Primary)  -     XR Chest 2 View    2. Type 2 diabetes mellitus with diabetic neuropathy, without long-term current use of insulin (CMS/[facility])    3. Confusion    Other orders  -     budesonide-formoterol (Symbicort) 160-4.5 MCG/ACT inhaler; Inhale 2 puffs 2 (Two) Times a Day.  Dispense: 10.2 g; Refill: 12          Current Outpatient Medications:   •  ALBUTEROL SULFATE  (90 Base) MCG/ACT inhaler, inhale TWO puffs EVERY 4 HOURS AS NEEDED FOR wheezing, Disp: 8.5 g, Rfl: 6  •  Blood Glucose Monitoring Suppl (TRUE TRACK BLOOD GLUCOSE) device, Check BS twice a day DX Code E11.9, Disp: , Rfl:   •  busPIRone (BUSPAR) 10 MG tablet, Take 1 tablet by mouth 3 (Three) Times a Day., Disp: 90 tablet, Rfl: 2  •  calcium carbonate-vitamin d 600-400 MG-UNIT per tablet, TAKE ONE TABLET BY MOUTH EVERY DAY, Disp: 90 tablet, Rfl: 3  •  diclofenac (VOLTAREN) 1 % gel gel, Apply 4 g topically to the appropriate area as directed 4 (Four) Times a Day., Disp: 1 tube, Rfl: 2  •  dorzolamide-timolol (COSOPT) 22.3-6.8 MG/ML ophthalmic solution, Administer 1 drop to both eyes Daily., Disp: , Rfl:   •   enalapril (VASOTEC) 20 MG tablet, Take 1 tablet by mouth 2 (Two) Times a Day., Disp: 180 tablet, Rfl: 1  •  fenofibrate (TRICOR) 145 MG tablet, Take 1 tablet by mouth every night at bedtime., Disp: 90 tablet, Rfl: 1  •  glucose blood test strip, Check blood sugar twice daily, Disp: 100 each, Rfl: 6  •  INVEGA SUSTENNA 234 MG/1.5ML suspension prefilled syringe IM injection, inject 1.5ml IM every month, Disp: , Rfl: 2  •  ipratropium-albuterol (DUO-NEB) 0.5-2.5 mg/3 ml nebulizer, USE contents of ONE vial in nebulizer EVERY 6 HOURS AS NEEDED FOR wheezing OR shortness of air, Disp: 360 mL, Rfl: 1  •  levothyroxine (SYNTHROID, LEVOTHROID) 112 MCG tablet, TAKE ONE TABLET BY MOUTH EVERY DAY, Disp: 30 tablet, Rfl: 2  •  LORazepam (ATIVAN) 0.5 MG tablet, Take 1 tablet by mouth every night at bedtime., Disp: 30 tablet, Rfl: 0  •  LUMIGAN 0.01 % ophthalmic drops, Administer 1 drop to both eyes every night at bedtime., Disp: 7.5 mL, Rfl: 11  •  metFORMIN (GLUCOPHAGE) 500 MG tablet, Take 1.5 tablets by mouth in the morning and 1 tablet in the evening, Disp: 225 tablet, Rfl: 1  •  metoprolol succinate XL (TOPROL-XL) 100 MG 24 hr tablet, Take 1 tablet by mouth in the morning, Disp: 30 tablet, Rfl: 2  •  metoprolol succinate XL (TOPROL-XL) 50 MG 24 hr tablet, Take 1 tablet by mouth Daily., Disp: 30 tablet, Rfl: 3  •  OLANZapine (zyPREXA) 20 MG tablet, Take 1 tablet by mouth every night at bedtime., Disp: 30 tablet, Rfl: 2  •  OXcarbazepine (Trileptal) 150 MG tablet, Take 1 tablet by mouth every night at bedtime., Disp: 30 tablet, Rfl: 2  •  pravastatin (PRAVACHOL) 10 MG tablet, Take 1 tablet by mouth Daily., Disp: 90 tablet, Rfl: 1  •  TRUEplus Lancets 30G misc, Inject 1 applicator under the skin into the appropriate area as directed 2 (Two) Times a Day. Check BS twice a day DX Code E11.9, Disp: 100 each, Rfl: 11  •  verapamil SR (CALAN-SR) 240 MG CR tablet, Take 1 tablet by mouth Daily., Disp: 30 tablet, Rfl: 2  •   budesonide-formoterol (Symbicort) 160-4.5 MCG/ACT inhaler, Inhale 2 puffs 2 (Two) Times a Day., Disp: 10.2 g, Rfl: 12

## 2021-03-30 NOTE — PATIENT INSTRUCTIONS
Schedule fasting blood work  Chest x-ray today  Stop trazodone  Symbicort inhaler twice a day rinse out mouth  Follow-up with psychiatry

## 2021-03-31 ENCOUNTER — TELEPHONE (OUTPATIENT)
Dept: FAMILY MEDICINE CLINIC | Facility: CLINIC | Age: 71
End: 2021-03-31

## 2021-04-01 LAB
BILIRUB BLD-MCNC: NEGATIVE MG/DL
CLARITY, POC: CLEAR
COLOR UR: YELLOW
GLUCOSE UR STRIP-MCNC: NEGATIVE MG/DL
KETONES UR QL: NEGATIVE
LEUKOCYTE EST, POC: NEGATIVE
NITRITE UR-MCNC: NEGATIVE MG/ML
PH UR: 8 [PH] (ref 5–8)
PROT UR STRIP-MCNC: NEGATIVE MG/DL
RBC # UR STRIP: NEGATIVE /UL
SP GR UR: 1.01 (ref 1–1.03)
UROBILINOGEN UR QL: NORMAL

## 2021-04-02 ENCOUNTER — TELEPHONE (OUTPATIENT)
Dept: FAMILY MEDICINE CLINIC | Facility: CLINIC | Age: 71
End: 2021-04-02

## 2021-04-02 NOTE — TELEPHONE ENCOUNTER
Patients daughter called Jasmyn and was asking if either Ryanne or her nurse would call her. Jasmyn her her sister were talking and think that its time that Renetta be placed in a nursing facility and [facility] told them they would need to go through her primary doctor

## 2021-04-05 NOTE — TELEPHONE ENCOUNTER
Eduarda mas call:    [facility] [phone]  [facility]  [phone]    Tell them she needs to talk with someone about permanent placement for her mother and if they have openings they should make appt with her to discuss

## 2021-04-06 NOTE — TELEPHONE ENCOUNTER
Spoke with Kathia and she will let the daughters know to contact whichever nursing home they want her in and they will then contact us.  SG

## 2021-04-07 ENCOUNTER — TELEPHONE (OUTPATIENT)
Dept: FAMILY MEDICINE CLINIC | Facility: CLINIC | Age: 71
End: 2021-04-07

## 2021-04-07 NOTE — TELEPHONE ENCOUNTER
KYARA CONTRERAS, PATIENT'S DAUGHTER, STATES THAT [facility] IN [place]  NEEDS THE FOLLOWING FOR ADMISSION:  - PATIENT'S MEDICATION LIST  - RECORDS FROM LAST FEW VISITS   - MOST RECENT LABS    DOCUMENTATION CAN BE FAXED TO   JUDI KENT'S ATTENTION [phone]

## 2021-04-22 ENCOUNTER — TELEPHONE (OUTPATIENT)
Dept: FAMILY MEDICINE CLINIC | Facility: CLINIC | Age: 71
End: 2021-04-22

## 2021-04-22 NOTE — TELEPHONE ENCOUNTER
Caller: KYARA CONTRERAS    Relationship to patient: DAUGHTER    Best call back number: [phone]    Patient is needing: PATIENT DAUGHTER WOULD LIKE TO DISCUSS GETTING THE PATIENT IN A NURSING HOME IN [place] BECAUSE THE PATIENT. SHE DOES NOT HOW TO GET THIS DONE OR WHAT SHE NEEDS TO DO /NEED BECAYSE THE PATIENT IS REFUSING TO GO. SHE STATED IT NEEDS TO BE DONE AND ITS TIME SHE IS JUST UNSURE THE STEPS TO DO THIS STATED THE PATIENT IS UNSAFE AT HOME. PLEASE ADVISE.

## 2021-04-22 NOTE — TELEPHONE ENCOUNTER
Patient will need to obtain power of  if she does not already or she will have to prove mother is not psychologically fit to make her own decisions, go to court and obtain power of  that way.  After that she will need to call the [facility] in [place] to set up an appointment with the admissions department and get the process rolling

## 2021-04-27 ENCOUNTER — TELEPHONE (OUTPATIENT)
Dept: FAMILY MEDICINE CLINIC | Facility: CLINIC | Age: 71
End: 2021-04-27

## 2021-04-27 RX ORDER — BUSPIRONE HYDROCHLORIDE 10 MG/1
10 TABLET ORAL 3 TIMES DAILY
Qty: 90 TABLET | Refills: 2 | Status: SHIPPED | OUTPATIENT
Start: 2021-04-27

## 2021-04-27 RX ORDER — ENALAPRIL MALEATE 20 MG/1
20 TABLET ORAL 2 TIMES DAILY
Qty: 180 TABLET | Refills: 1 | Status: SHIPPED | OUTPATIENT
Start: 2021-04-27

## 2021-04-27 RX ORDER — BUSPIRONE HYDROCHLORIDE 10 MG/1
10 TABLET ORAL 3 TIMES DAILY
Qty: 90 TABLET | Refills: 2 | Status: CANCELLED | OUTPATIENT
Start: 2021-04-27

## 2021-04-27 RX ORDER — VERAPAMIL HYDROCHLORIDE 240 MG/1
240 TABLET, FILM COATED, EXTENDED RELEASE ORAL DAILY
Qty: 30 TABLET | Refills: 2 | Status: CANCELLED | OUTPATIENT
Start: 2021-04-27

## 2021-04-27 RX ORDER — ENALAPRIL MALEATE 20 MG/1
20 TABLET ORAL 2 TIMES DAILY
Qty: 180 TABLET | Refills: 1 | Status: CANCELLED | OUTPATIENT
Start: 2021-04-27

## 2021-04-27 RX ORDER — VERAPAMIL HYDROCHLORIDE 240 MG/1
240 TABLET, FILM COATED, EXTENDED RELEASE ORAL DAILY
Qty: 30 TABLET | Refills: 2 | Status: SHIPPED | OUTPATIENT
Start: 2021-04-27

## 2021-04-27 NOTE — TELEPHONE ENCOUNTER
Caller: Alanna Renetta TEE    Relationship: Self    Best call back number:[phone]    Medication needed:   Requested Prescriptions     Pending Prescriptions Disp Refills   • verapamil SR (CALAN-SR) 240 MG CR tablet 30 tablet 2     Sig: Take 1 tablet by mouth Daily.   • enalapril (VASOTEC) 20 MG tablet 180 tablet 1     Sig: Take 1 tablet by mouth 2 (Two) Times a Day.   • busPIRone (BUSPAR) 10 MG tablet 90 tablet 2     Sig: Take 1 tablet by mouth 3 (Three) Times a Day.       When do you need the refill by: ASAP  What additional details did the patient provide when requesting the medication: PATIENT STATES THAT SHE IS OUT OF HER MEDICATIONS.    Does the patient have less than a 3 day supply:  [x] Yes  [] No    What is the patient's preferred pharmacy: [address] [phone] [facility] [phone]      PATIENT REQUEST A MEDICATION LIST AS WELL. PATIENT DOES HAVE AN APPOINTMENT TOMORROW AND WOULD LIKE TO PICK IT UP THEN.

## 2021-04-28 ENCOUNTER — OFFICE VISIT (OUTPATIENT)
Dept: FAMILY MEDICINE CLINIC | Facility: CLINIC | Age: 71
End: 2021-04-28

## 2021-04-28 VITALS
HEIGHT: 63 IN | HEART RATE: 83 BPM | OXYGEN SATURATION: 93 % | DIASTOLIC BLOOD PRESSURE: 80 MMHG | WEIGHT: 183.2 LBS | SYSTOLIC BLOOD PRESSURE: 126 MMHG | BODY MASS INDEX: 32.46 KG/M2 | TEMPERATURE: 97.8 F

## 2021-04-28 DIAGNOSIS — N81.10 BLADDER PROLAPSE, FEMALE, ACQUIRED: Chronic | ICD-10-CM

## 2021-04-28 DIAGNOSIS — Z91.14 POOR COMPLIANCE WITH MEDICATION: ICD-10-CM

## 2021-04-28 DIAGNOSIS — R41.0 CONFUSION: ICD-10-CM

## 2021-04-28 DIAGNOSIS — E66.09 CLASS 1 OBESITY DUE TO EXCESS CALORIES WITH SERIOUS COMORBIDITY AND BODY MASS INDEX (BMI) OF 33.0 TO 33.9 IN ADULT: ICD-10-CM

## 2021-04-28 DIAGNOSIS — N81.4 PROLAPSED UTERUS: Primary | ICD-10-CM

## 2021-04-28 DIAGNOSIS — E11.40 TYPE 2 DIABETES MELLITUS WITH DIABETIC NEUROPATHY, WITHOUT LONG-TERM CURRENT USE OF INSULIN (HCC): ICD-10-CM

## 2021-04-28 PROCEDURE — 99214 OFFICE O/P EST MOD 30 MIN: CPT | Performed by: NURSE PRACTITIONER

## 2021-04-28 RX ORDER — CEPHALEXIN 500 MG/1
500 CAPSULE ORAL 2 TIMES DAILY
Qty: 20 CAPSULE | Refills: 0 | Status: SHIPPED | OUTPATIENT
Start: 2021-04-28 | End: 2021-05-05

## 2021-04-28 RX ORDER — TRAZODONE HYDROCHLORIDE 150 MG/1
1 TABLET ORAL DAILY
COMMUNITY
Start: 2021-04-26

## 2021-04-28 NOTE — PROGRESS NOTES
Renetta Mondragon is a 70 y.o. female.     70-year-old white female with history of hiatal hernia, depression, hypertension, type 2 diabetes, COPD, hypothyroidism, bladder and rectal prolapse who comes in today with 2 daughters want to talk about possible placement and prolapse bladder  Patient either has a prolapsed bladder or uterus but it is quite large and make it uncomfortable for patient to sit.  Due to the increasing size is also developing skin integrity issues with redness and open skin on prolapse.  Patient has never seen an OB/GYN or general surgeon for this and has always refused visits but now is becoming too uncomfortable she wants to see someone.  I am sending her to OB/GYN for evaluation.  I am placing her on antibiotics prophylactically for the skin breakdown on the prolapse and will have home health care nurse evaluate  Patient lives alone and has [facility] [employer] coming in to help her with some things but they are very concerned with living conditions due to patient's mental and physical incapacities.  Daughters do not have power of  but patient finally agreed today to talk to  from home health about possible placement.  I explained to the daughter how she can obtain power of  if needed  Blood pressure 126/80 heart rate 82 she denies any chest pain, dyspnea, tachycardia or dizziness \  Weight is unchanged at 183 with a BMI of 33  Patient needs to schedule an eye exam    OB/GYN referral   Home health referral           The following portions of the patient's history were reviewed and updated as appropriate: allergies, current medications, past family history, past medical history, past social history, past surgical history and problem list.    Vitals:    04/28/21 1132   BP: 126/80   BP Location: Right arm   Patient Position: Sitting   Cuff Size: Large Adult   Pulse: 83   Temp: 97.8 °F (36.6 °C)   TempSrc: Temporal   SpO2: 93%   Weight: 83.1 kg (183 lb 3.2 oz)  "  Height: 158.8 cm (62.5\")     Body mass index is 32.97 kg/m².    Past Medical History:   Diagnosis Date   • Cigarette smoker 3/23/2015   • Diabetes mellitus (CMS/HCC)    • Hyperlipidemia    • Hypertension    • Hypothyroidism      Past Surgical History:   Procedure Laterality Date   • HYSTERECTOMY       Family History   Family history unknown: Yes     Immunization History   Administered Date(s) Administered   • FLUAD TRI 65YR+ 10/02/2019   • Flu Vaccine Intradermal Quad 18-64YR 10/18/2006, 09/15/2015   • Fluad Quad 65+ 10/02/2019   • Fluzone High Dose =>65 Years ([employer] ONLY) 09/12/2016, 09/20/2018   • Pneumococcal Conjugate 13-Valent (PCV13) 09/20/2017   • Pneumococcal Polysaccharide (PPSV23) 12/30/2019   • Zostavax 07/02/2012       Office Visit on 03/30/2021   Component Date Value Ref Range Status   • Color 04/01/2021 Yellow  Yellow, Straw, Dark Yellow, Rosalva Final   • Clarity, UA 04/01/2021 Clear  Clear Final   • Specific Gravity  04/01/2021 1.015  1.005 - 1.030 Final   • pH, Urine 04/01/2021 8.0  5.0 - 8.0 Final   • Leukocytes 04/01/2021 Negative  Negative Final   • Nitrite, UA 04/01/2021 Negative  Negative Final   • Protein, POC 04/01/2021 Negative  Negative mg/dL Final   • Glucose, UA 04/01/2021 Negative  Negative, 1000 mg/dL (3+) mg/dL Final   • Ketones, UA 04/01/2021 Negative  Negative Final   • Urobilinogen, UA 04/01/2021 Normal  Normal Final   • Bilirubin 04/01/2021 Negative  Negative Final   • Blood, UA 04/01/2021 Negative  Negative Final         Review of Systems   Constitutional: Negative.    HENT: Negative.    Respiratory: Negative.    Cardiovascular: Negative.    Gastrointestinal:        Pain when sitting   Genitourinary: Positive for urinary incontinence.   Musculoskeletal: Negative.    Neurological: Positive for confusion.       Objective   Physical Exam  Constitutional:       Appearance: Normal appearance.   HENT:      Head: Normocephalic.   Cardiovascular:      Rate and Rhythm: Normal rate and " regular rhythm.      Pulses: Normal pulses.      Heart sounds: Normal heart sounds.   Pulmonary:      Effort: Pulmonary effort is normal.      Breath sounds: Normal breath sounds.   Abdominal:      General: Bowel sounds are normal.   Genitourinary:     Comments: Large prolapsed bladder or uterus as well as prolapsed rectum with urinary and bowel incontinence  Musculoskeletal:      Cervical back: Normal range of motion.      Comments: Very unsteady gait   Skin:     Comments: Skin on prolapsed organ breaking down   Neurological:      General: No focal deficit present.      Mental Status: She is alert.      Comments: Patient mildly confused not totally oriented   Psychiatric:         Mood and Affect: Mood normal.         Behavior: Behavior normal.         Procedures    Assessment/Plan   Diagnoses and all orders for this visit:    1. Prolapsed uterus (Primary)  -     Ambulatory Referral to Obstetrics / Gynecology  -     Ambulatory Referral to Home Health    2. Poor compliance with medication  -     Ambulatory Referral to Home Health    3. Class 1 obesity due to excess calories with serious comorbidity and body mass index (BMI) of 33.0 to 33.9 in adult    4. Type 2 diabetes mellitus with diabetic neuropathy, without long-term current use of insulin (CMS/[facility])    5. Bladder prolapse, female, acquired    6. Confusion    Other orders  -     cephalexin (KEFLEX) 500 MG capsule; Take 1 capsule by mouth 2 (Two) Times a Day for 7 days.  Dispense: 20 capsule; Refill: 0          Current Outpatient Medications:   •  ALBUTEROL SULFATE  (90 Base) MCG/ACT inhaler, inhale TWO puffs EVERY 4 HOURS AS NEEDED FOR wheezing, Disp: 8.5 g, Rfl: 6  •  Blood Glucose Monitoring Suppl (TRUE TRACK BLOOD GLUCOSE) device, Check BS twice a day DX Code E11.9, Disp: , Rfl:   •  budesonide-formoterol (Symbicort) 160-4.5 MCG/ACT inhaler, Inhale 2 puffs 2 (Two) Times a Day., Disp: 10.2 g, Rfl: 12  •  busPIRone (BUSPAR) 10 MG tablet, Take 1 tablet by  mouth 3 (Three) Times a Day., Disp: 90 tablet, Rfl: 2  •  calcium carbonate-vitamin d 600-400 MG-UNIT per tablet, TAKE ONE TABLET BY MOUTH EVERY DAY, Disp: 90 tablet, Rfl: 3  •  diclofenac (VOLTAREN) 1 % gel gel, Apply 4 g topically to the appropriate area as directed 4 (Four) Times a Day., Disp: 1 tube, Rfl: 2  •  dorzolamide-timolol (COSOPT) 22.3-6.8 MG/ML ophthalmic solution, Administer 1 drop to both eyes Daily., Disp: , Rfl:   •  enalapril (VASOTEC) 20 MG tablet, Take 1 tablet by mouth 2 (Two) Times a Day., Disp: 180 tablet, Rfl: 1  •  fenofibrate (TRICOR) 145 MG tablet, Take 1 tablet by mouth every night at bedtime., Disp: 90 tablet, Rfl: 1  •  glucose blood test strip, Check blood sugar twice daily, Disp: 100 each, Rfl: 6  •  INVEGA SUSTENNA 234 MG/1.5ML suspension prefilled syringe IM injection, inject 1.5ml IM every month, Disp: , Rfl: 2  •  ipratropium-albuterol (DUO-NEB) 0.5-2.5 mg/3 ml nebulizer, USE contents of ONE vial in nebulizer EVERY 6 HOURS AS NEEDED FOR wheezing OR shortness of air, Disp: 360 mL, Rfl: 1  •  levothyroxine (SYNTHROID, LEVOTHROID) 112 MCG tablet, TAKE ONE TABLET BY MOUTH EVERY DAY, Disp: 30 tablet, Rfl: 2  •  LORazepam (ATIVAN) 0.5 MG tablet, Take 1 tablet by mouth every night at bedtime., Disp: 30 tablet, Rfl: 0  •  LUMIGAN 0.01 % ophthalmic drops, Administer 1 drop to both eyes every night at bedtime., Disp: 7.5 mL, Rfl: 11  •  metFORMIN (GLUCOPHAGE) 500 MG tablet, Take 1.5 tablets by mouth in the morning and 1 tablet in the evening, Disp: 225 tablet, Rfl: 1  •  metoprolol succinate XL (TOPROL-XL) 100 MG 24 hr tablet, Take 1 tablet by mouth in the morning, Disp: 30 tablet, Rfl: 2  •  OLANZapine (zyPREXA) 20 MG tablet, Take 1 tablet by mouth every night at bedtime., Disp: 30 tablet, Rfl: 2  •  OXcarbazepine (Trileptal) 150 MG tablet, Take 1 tablet by mouth every night at bedtime., Disp: 30 tablet, Rfl: 2  •  pravastatin (PRAVACHOL) 10 MG tablet, Take 1 tablet by mouth Daily., Disp:  90 tablet, Rfl: 1  •  traZODone (DESYREL) 150 MG tablet, Take 1 tablet by mouth Daily., Disp: , Rfl:   •  TRUEplus Lancets 30G misc, Inject 1 applicator under the skin into the appropriate area as directed 2 (Two) Times a Day. Check BS twice a day DX Code E11.9, Disp: 100 each, Rfl: 11  •  verapamil SR (CALAN-SR) 240 MG CR tablet, Take 1 tablet by mouth Daily., Disp: 30 tablet, Rfl: 2  •  cephalexin (KEFLEX) 500 MG capsule, Take 1 capsule by mouth 2 (Two) Times a Day for 7 days., Disp: 20 capsule, Rfl: 0

## 2021-04-28 NOTE — PATIENT INSTRUCTIONS
Keep appointment with OB/GYN  Home health will call you  to set up appointment  Consider seeing the  for power of

## 2021-05-04 ENCOUNTER — TELEPHONE (OUTPATIENT)
Dept: FAMILY MEDICINE CLINIC | Facility: CLINIC | Age: 71
End: 2021-05-04

## 2021-05-04 NOTE — TELEPHONE ENCOUNTER
Caller: KYARA   Relationship to patient: PATIENTS DAUGHTER    Best call back number:     Date of exposure:     Date of positive COVID19 test:     Date if possible COVID19 exposure:     COVID19 symptoms:     Date of initial quarantine:     Additional information or concerns: PATIENTS DAUGHTER IS CALLING IN STATING THAT THE PATIENT IS DUE TO HAVE HER COVID VACCINE ON Saturday AND SHE IS ON A LOT OF MEDICATIONS SO KYARA WANTS TO KNOW IF DR ROJAS THINKS THAT THIS IS A GOOD IDEA FOR HER TO GET THE VACCINE.      What is the patients preferred pharmacy:

## 2021-05-04 NOTE — TELEPHONE ENCOUNTER
PATIENTS DAUGHTER KYARA IS CALLING IN SHE STATES THAT THE REFERRAL FOR THE OBGYN THEY ARE SAYING THEY DO NOT TAKE PATIENTS INSURANCE AND SHE WANTS TO KNOW IF THERE IS SOMEONE ELSE SHE CAN SEE THAT WOULD TAKE HER INSURANCE.      SHE ALSO HAS A FEW OTHER QUESTIONS ABOUT A FEW OTHER SPECIALISTS THAT SHE IS SUPPOSED TO SEE AND ABOUT WOUND CARE AS WELL.      PLEASE ADVISE    CALLBACK NUMBER  [phone]

## 2021-05-05 ENCOUNTER — TELEPHONE (OUTPATIENT)
Dept: FAMILY MEDICINE CLINIC | Facility: CLINIC | Age: 71
End: 2021-05-05

## 2021-05-05 DIAGNOSIS — R91.8 LUNG MASS: Primary | ICD-10-CM

## 2021-05-05 NOTE — TELEPHONE ENCOUNTER
Pt's  Kathia called in stating she had the pt go to the ER yesterday for extreme shortness of breath. Kathia states the ER doctor advised her and the pt that the pt would benefit from seeing a pulmonologist or being put on oxygen by her PCP if willing. Please advise

## 2021-05-05 NOTE — TELEPHONE ENCOUNTER
Spoke with Kathia and let her know.  She wanted pt to see Dr. Davis.  Renetta wanted to stay in [place].  SG

## 2021-05-06 ENCOUNTER — OFFICE VISIT (OUTPATIENT)
Dept: FAMILY MEDICINE CLINIC | Facility: CLINIC | Age: 71
End: 2021-05-06

## 2021-05-06 VITALS
HEART RATE: 98 BPM | BODY MASS INDEX: 32.74 KG/M2 | WEIGHT: 184.8 LBS | TEMPERATURE: 97.1 F | OXYGEN SATURATION: 90 % | SYSTOLIC BLOOD PRESSURE: 150 MMHG | HEIGHT: 63 IN | DIASTOLIC BLOOD PRESSURE: 90 MMHG

## 2021-05-06 VITALS — BODY MASS INDEX: 32.97 KG/M2 | HEIGHT: 63 IN

## 2021-05-06 DIAGNOSIS — J43.8 OTHER EMPHYSEMA (HCC): Primary | ICD-10-CM

## 2021-05-06 DIAGNOSIS — R91.8 LUNG MASS: ICD-10-CM

## 2021-05-06 PROCEDURE — 99213 OFFICE O/P EST LOW 20 MIN: CPT | Performed by: NURSE PRACTITIONER

## 2021-05-06 NOTE — PATIENT INSTRUCTIONS
O2 2 L 24/7 at least at night  Keep appointment with pulmonology  How to finalize placement issues with patient

## 2021-05-06 NOTE — PROGRESS NOTES
"    Renetta Mondragon is a 70 y.o. female.     70-year-old white female with history of hiatal hernia, depression, hypertension, type 2 diabetes, COPD, hypothyroidism, bladder and rectal prolapse who comes in today for follow-up ER visit for dyspnea. Patient had a negative chest x-ray and CT of the chest however sats were running low and ER recommended pulmonary consult. We did a oxygen walking study in the office today and oxygen level stayed at at 90% at rest on room air and dropped to 87% walking on room air.   Oxygen at 2 L was placed on the patient and we ambulated her again and her oxygen levels came up to 95% walking and 97% at rest.    Patient was just here with family and discussed placement for patient since she is unable to care for herself work to follow a regular medicine regime. She is not doing her nebulizer treatments or her inhalers which if she did she may not need oxygen. I am going to set her up with oxygen if nothing else hopefully she will wear it at night  until they can get this placement situation straightened out  Blood pressure 150/84 heart rate 98 she denies any chest pain, dyspnea, tachycardia or dizziness      Oxygen 2 L at home when we did a oxygen walking study in the office today and oxygen level stayed  Keep appointment with pulmonology  Get placement situation with patient figured out quickly       The following portions of the patient's history were reviewed and updated as appropriate: allergies, current medications, past family history, past medical history, past social history, past surgical history and problem list.    Vitals:    05/06/21 1542   BP: 150/90   BP Location: Right arm   Patient Position: Sitting   Cuff Size: Large Adult   Pulse: 98   Temp: 97.1 °F (36.2 °C)   TempSrc: Temporal   SpO2: 90%   Weight: 83.8 kg (184 lb 12.8 oz)   Height: 158.8 cm (62.5\")     Body mass index is 33.26 kg/m².    Past Medical History:   Diagnosis Date   • Cigarette smoker 3/23/2015   • " Diabetes mellitus (CMS/HCC)    • Hyperlipidemia    • Hypertension    • Hypothyroidism      Past Surgical History:   Procedure Laterality Date   • HYSTERECTOMY       Family History   Family history unknown: Yes     Immunization History   Administered Date(s) Administered   • FLUAD TRI 65YR+ 10/02/2019   • Flu Vaccine Intradermal Quad 18-64YR 10/18/2006, 09/15/2015   • Fluad Quad 65+ 10/02/2019   • Fluzone High Dose =>65 Years ([employer] ONLY) 09/12/2016, 09/20/2018   • Pneumococcal Conjugate 13-Valent (PCV13) 09/20/2017   • Pneumococcal Polysaccharide (PPSV23) 12/30/2019   • Zostavax 07/02/2012       Office Visit on 03/30/2021   Component Date Value Ref Range Status   • Color 04/01/2021 Yellow  Yellow, Straw, Dark Yellow, Rosalva Final   • Clarity, UA 04/01/2021 Clear  Clear Final   • Specific Gravity  04/01/2021 1.015  1.005 - 1.030 Final   • pH, Urine 04/01/2021 8.0  5.0 - 8.0 Final   • Leukocytes 04/01/2021 Negative  Negative Final   • Nitrite, UA 04/01/2021 Negative  Negative Final   • Protein, POC 04/01/2021 Negative  Negative mg/dL Final   • Glucose, UA 04/01/2021 Negative  Negative, 1000 mg/dL (3+) mg/dL Final   • Ketones, UA 04/01/2021 Negative  Negative Final   • Urobilinogen, UA 04/01/2021 Normal  Normal Final   • Bilirubin 04/01/2021 Negative  Negative Final   • Blood, UA 04/01/2021 Negative  Negative Final         Review of Systems   Constitutional: Negative.    HENT: Negative.    Respiratory: Negative.    Cardiovascular: Negative.    Gastrointestinal: Negative.    Genitourinary: Negative.    Musculoskeletal: Negative.    Skin: Negative.    Neurological: Negative.    Psychiatric/Behavioral: Negative.        Objective   Physical Exam  Constitutional:       Appearance: Normal appearance.   HENT:      Head: Normocephalic.   Cardiovascular:      Rate and Rhythm: Normal rate and regular rhythm.      Pulses: Normal pulses.      Heart sounds: Normal heart sounds.   Pulmonary:      Effort: Pulmonary effort is normal.       Comments: O2 sats 190s at rest and with ambulation lungs diminished  Abdominal:      General: Bowel sounds are normal.   Musculoskeletal:         General: Normal range of motion.   Skin:     General: Skin is warm and dry.   Neurological:      General: No focal deficit present.      Mental Status: She is alert and oriented to person, place, and time.   Psychiatric:         Mood and Affect: Mood normal.         Procedures    Assessment/Plan   Diagnoses and all orders for this visit:    1. Other emphysema (CMS/HCC) (Primary)    2. Lung mass          Current Outpatient Medications:   •  ALBUTEROL SULFATE  (90 Base) MCG/ACT inhaler, inhale TWO puffs EVERY 4 HOURS AS NEEDED FOR wheezing, Disp: 8.5 g, Rfl: 6  •  Blood Glucose Monitoring Suppl (TRUE TRACK BLOOD GLUCOSE) device, Check BS twice a day DX Code E11.9, Disp: , Rfl:   •  budesonide-formoterol (Symbicort) 160-4.5 MCG/ACT inhaler, Inhale 2 puffs 2 (Two) Times a Day., Disp: 10.2 g, Rfl: 12  •  busPIRone (BUSPAR) 10 MG tablet, Take 1 tablet by mouth 3 (Three) Times a Day., Disp: 90 tablet, Rfl: 2  •  calcium carbonate-vitamin d 600-400 MG-UNIT per tablet, TAKE ONE TABLET BY MOUTH EVERY DAY, Disp: 90 tablet, Rfl: 3  •  diclofenac (VOLTAREN) 1 % gel gel, Apply 4 g topically to the appropriate area as directed 4 (Four) Times a Day., Disp: 1 tube, Rfl: 2  •  dorzolamide-timolol (COSOPT) 22.3-6.8 MG/ML ophthalmic solution, Administer 1 drop to both eyes Daily., Disp: , Rfl:   •  enalapril (VASOTEC) 20 MG tablet, Take 1 tablet by mouth 2 (Two) Times a Day., Disp: 180 tablet, Rfl: 1  •  fenofibrate (TRICOR) 145 MG tablet, Take 1 tablet by mouth every night at bedtime., Disp: 90 tablet, Rfl: 1  •  glucose blood test strip, Check blood sugar twice daily, Disp: 100 each, Rfl: 6  •  INVEGA SUSTENNA 234 MG/1.5ML suspension prefilled syringe IM injection, inject 1.5ml IM every month, Disp: , Rfl: 2  •  ipratropium-albuterol (DUO-NEB) 0.5-2.5 mg/3 ml nebulizer, USE  contents of ONE vial in nebulizer EVERY 6 HOURS AS NEEDED FOR wheezing OR shortness of air, Disp: 360 mL, Rfl: 1  •  levothyroxine (SYNTHROID, LEVOTHROID) 112 MCG tablet, TAKE ONE TABLET BY MOUTH EVERY DAY, Disp: 30 tablet, Rfl: 2  •  LORazepam (ATIVAN) 0.5 MG tablet, Take 1 tablet by mouth every night at bedtime., Disp: 30 tablet, Rfl: 0  •  LUMIGAN 0.01 % ophthalmic drops, Administer 1 drop to both eyes every night at bedtime., Disp: 7.5 mL, Rfl: 11  •  metFORMIN (GLUCOPHAGE) 500 MG tablet, Take 1.5 tablets by mouth in the morning and 1 tablet in the evening, Disp: 225 tablet, Rfl: 1  •  metoprolol succinate XL (TOPROL-XL) 100 MG 24 hr tablet, Take 1 tablet by mouth in the morning, Disp: 30 tablet, Rfl: 2  •  OLANZapine (zyPREXA) 20 MG tablet, Take 1 tablet by mouth every night at bedtime., Disp: 30 tablet, Rfl: 2  •  OXcarbazepine (Trileptal) 150 MG tablet, Take 1 tablet by mouth every night at bedtime., Disp: 30 tablet, Rfl: 2  •  pravastatin (PRAVACHOL) 10 MG tablet, Take 1 tablet by mouth Daily., Disp: 90 tablet, Rfl: 1  •  traZODone (DESYREL) 150 MG tablet, Take 1 tablet by mouth Daily., Disp: , Rfl:   •  TRUEplus Lancets 30G misc, Inject 1 applicator under the skin into the appropriate area as directed 2 (Two) Times a Day. Check BS twice a day DX Code E11.9, Disp: 100 each, Rfl: 11  •  verapamil SR (CALAN-SR) 240 MG CR tablet, Take 1 tablet by mouth Daily., Disp: 30 tablet, Rfl: 2

## 2021-05-10 ENCOUNTER — OFFICE VISIT (OUTPATIENT)
Dept: PULMONOLOGY | Facility: HOSPITAL | Age: 71
End: 2021-05-10

## 2021-05-10 DIAGNOSIS — Z91.199 NO-SHOW FOR APPOINTMENT: Primary | ICD-10-CM

## 2021-05-12 ENCOUNTER — TELEPHONE (OUTPATIENT)
Dept: FAMILY MEDICINE CLINIC | Facility: CLINIC | Age: 71
End: 2021-05-12

## 2021-05-12 NOTE — TELEPHONE ENCOUNTER
THIS MESSAGE IS FOR: CASH ROJAS    PATIENT'S DAUGHTER CALLED STATING:     THEY ORDERED OXYGEN FOR PATIENT LAST TIME AT THE OFFICE   AND MOTHER IS NOW IN [place] AND IT SHOULD GO THERE INSTEAD   THE ADDRESS IS [address] [place] [address] [place]         PLEASE ADVISE/CALL PATIENT IF YOU HAVE ANY QUESTIONS- PHONE NUMBER:   DEANNE MYERS () [phone] (M)

## 2021-05-17 ENCOUNTER — TELEPHONE (OUTPATIENT)
Dept: FAMILY MEDICINE CLINIC | Facility: CLINIC | Age: 71
End: 2021-05-17

## 2021-05-17 NOTE — TELEPHONE ENCOUNTER
Caller: CHRISTIAN CONTRERAS    Relationship: DAUGHTER    Best call back number: [phone]    What form or medical record are you requesting: [employer]    Who is requesting this form or medical record from you: DAUGHTER CHRISTIAN CONTRERAS    How would you like to receive the form or medical records: FAX NUMBER:[phone]    Timeframe paperwork needed:  ASAP

## 2021-09-03 ENCOUNTER — TELEPHONE (OUTPATIENT)
Dept: FAMILY MEDICINE CLINIC | Facility: CLINIC | Age: 71
End: 2021-09-03

## 2021-09-03 NOTE — TELEPHONE ENCOUNTER
Caller: KARTHIK WITH [facility]     Relationship to patient: Other    Best call back number: [phone]    Patient is needing:KARTHIK WITH [facility] CALLED TO INFORM HUMBLE ROJAS THAT PATIENT WAS DISCHARGED FROM [facility] ON 9/2/21. SHE SAID THAT PATIENT WOULD CALL TO SCHEDULE FOLLOW UP APPOINTMENT.